# Patient Record
Sex: FEMALE | Race: WHITE | NOT HISPANIC OR LATINO | Employment: UNEMPLOYED | URBAN - METROPOLITAN AREA
[De-identification: names, ages, dates, MRNs, and addresses within clinical notes are randomized per-mention and may not be internally consistent; named-entity substitution may affect disease eponyms.]

---

## 2017-02-23 DIAGNOSIS — Z30.41 ENCOUNTER FOR SURVEILLANCE OF CONTRACEPTIVE PILLS: ICD-10-CM

## 2017-02-23 RX ORDER — DESOGESTREL AND ETHINYL ESTRADIOL 0.15-0.03
1 KIT ORAL DAILY
Qty: 30 TABLET | Refills: 0 | Status: SHIPPED | OUTPATIENT
Start: 2017-02-23 | End: 2017-04-24

## 2017-02-23 NOTE — TELEPHONE ENCOUNTER
apri      Last Written Prescription Date: 3-9-16  Last Fill Quantity: 90,  # refills: 3   [place] [facility] or [facility] prescribing provider: 3-9-16

## 2017-02-23 NOTE — TELEPHONE ENCOUNTER
Medication is being filled for 1 time refill only due to:  Patient needs to be seen because it has been more than one year since last visit.   Adrianne Ivey RN, BSN  [facility]

## 2017-04-24 ENCOUNTER — OFFICE VISIT (OUTPATIENT)
Dept: FAMILY MEDICINE | Facility: CLINIC | Age: 32
End: 2017-04-24
Payer: COMMERCIAL

## 2017-04-24 VITALS
HEIGHT: 62 IN | HEART RATE: 73 BPM | DIASTOLIC BLOOD PRESSURE: 74 MMHG | TEMPERATURE: 98.6 F | BODY MASS INDEX: 22.25 KG/M2 | WEIGHT: 120.9 LBS | SYSTOLIC BLOOD PRESSURE: 102 MMHG | OXYGEN SATURATION: 100 %

## 2017-04-24 DIAGNOSIS — Z30.41 ENCOUNTER FOR SURVEILLANCE OF CONTRACEPTIVE PILLS: Primary | ICD-10-CM

## 2017-04-24 DIAGNOSIS — L70.9 ACNE, UNSPECIFIED ACNE TYPE: ICD-10-CM

## 2017-04-24 LAB — BETA HCG QUAL IFA URINE: NEGATIVE

## 2017-04-24 PROCEDURE — 99213 OFFICE O/P EST LOW 20 MIN: CPT | Performed by: FAMILY MEDICINE

## 2017-04-24 PROCEDURE — 84703 CHORIONIC GONADOTROPIN ASSAY: CPT | Performed by: FAMILY MEDICINE

## 2017-04-24 RX ORDER — DESOGESTREL AND ETHINYL ESTRADIOL 0.15-0.03
1 KIT ORAL DAILY
Qty: 90 TABLET | Refills: 3 | Status: SHIPPED | OUTPATIENT
Start: 2017-04-24 | End: 2018-01-23

## 2017-04-24 RX ORDER — ADAPALENE 45 G/G
GEL TOPICAL AT BEDTIME
Qty: 45 G | Refills: 11 | Status: SHIPPED | OUTPATIENT
Start: 2017-04-24 | End: 2018-01-23

## 2017-04-24 ASSESSMENT — ANXIETY QUESTIONNAIRES
IF YOU CHECKED OFF ANY PROBLEMS ON THIS QUESTIONNAIRE, HOW DIFFICULT HAVE THESE PROBLEMS MADE IT FOR YOU TO DO YOUR WORK, TAKE CARE OF THINGS AT HOME, OR GET ALONG WITH OTHER PEOPLE: NOT DIFFICULT AT ALL
1. FEELING NERVOUS, ANXIOUS, OR ON EDGE: NOT AT ALL
GAD7 TOTAL SCORE: 0
3. WORRYING TOO MUCH ABOUT DIFFERENT THINGS: NOT AT ALL
7. FEELING AFRAID AS IF SOMETHING AWFUL MIGHT HAPPEN: NOT AT ALL
2. NOT BEING ABLE TO STOP OR CONTROL WORRYING: NOT AT ALL
5. BEING SO RESTLESS THAT IT IS HARD TO SIT STILL: NOT AT ALL
6. BECOMING EASILY ANNOYED OR IRRITABLE: NOT AT ALL

## 2017-04-24 ASSESSMENT — PATIENT HEALTH QUESTIONNAIRE - PHQ9: 5. POOR APPETITE OR OVEREATING: NOT AT ALL

## 2017-04-24 NOTE — PROGRESS NOTES
"Chief Complaint   Patient presents with     Contraception       Initial /74  Pulse 73  Temp 98.6  F (37  C) (Oral)  Ht 5' 2\" (1.575 m)  Wt 120 lb 14.4 oz (54.8 kg)  LMP 04/18/2017  SpO2 100%  BMI 22.11 kg/m2 Estimated body mass index is 22.11 kg/(m^2) as calculated from the following:    Height as of this encounter: 5' 2\" (1.575 m).    Weight as of this encounter: 120 lb 14.4 oz (54.8 kg).  Medication Reconciliation: complete   Bernarda Lerma Medical Assistant      "

## 2017-04-24 NOTE — MR AVS SNAPSHOT
"              After Visit Summary   2017    Bettina Andre    MRN: [id number]           Patient Information     Date Of Birth          1985        Visit Information        Provider Department      2017 2:20 PM Weiler, Karen, MD [facility]        Today's Diagnoses     Encounter for surveillance of contraceptive pills    -  1    Acne, unspecified acne type           Follow-ups after your visit        Who to contact     If you have questions or need follow up information about today's clinic visit or your schedule please contact [facility] directly at [phone].  Normal or non-critical lab and imaging results will be communicated to you by Lumicshart, letter or phone within 4 business days after the clinic has received the results. If you do not hear from us within 7 days, please contact the clinic through Lumicshart or phone. If you have a critical or abnormal lab result, we will notify you by phone as soon as possible.  Submit refill requests through Okairos or call your pharmacy and they will forward the refill request to us. Please allow 3 business days for your refill to be completed.          Additional Information About Your Visit        MyChart Information     Okairos lets you send messages to your doctor, view your test results, renew your prescriptions, schedule appointments and more. To sign up, go to [email or web address] . Click on \"Log in\" on the left side of the screen, which will take you to the Welcome page. Then click on \"Sign up Now\" on the right side of the page.     You will be asked to enter the access code listed below, as well as some personal information. Please follow the directions to create your username and password.     Your access code is: [email or web address]  Expires: 2017  2:51 PM     Your access code will  in 90 days. If you need help or a new code, please call your [facility] or [phone].        Care EveryWhere ID     This is " "your Care EveryWhere ID. This could be used by other organizations to access your [place] medical records  [id number]        Your Vitals Were     Pulse Temperature Height Last Period Pulse Oximetry BMI (Body Mass Index)    73 98.6  F (37  C) (Oral) 5' 2\" (1.575 m) 04/18/2017 100% 22.11 kg/m2       Blood Pressure from Last 3 Encounters:   04/24/17 102/74   03/09/16 108/66   01/15/15 116/74    Weight from Last 3 Encounters:   04/24/17 120 lb 14.4 oz (54.8 kg)   03/09/16 121 lb (54.9 kg)   01/15/15 125 lb (56.7 kg)              We Performed the Following     Beta HCG qual IFA urine          Today's Medication Changes          These changes are accurate as of: 4/24/17  2:51 PM.  If you have any questions, ask your nurse or doctor.               Start taking these medicines.        Dose/Directions    adapalene 0.1 % gel   Commonly known as:  DIFFERIN   Used for:  Acne, unspecified acne type   Started by:  Weiler, Karen, MD        Apply topically At Bedtime   Quantity:  45 g   Refills:  11            Where to get your medicines      These medications were sent to [facility] [address] [place] [address] [facility] [address] [place] [id number]     Phone:  [phone]     adapalene 0.1 % gel    desogestrel-ethinyl estradiol 0.15-30 MG-MCG per tablet                Primary Care Provider Office Phone # Fax #    Karen Weiler, [phone]       [facility] [address] [facility] [address]        Thank you!     Thank you for choosing [facility]  for your care. Our goal is always to provide you with excellent care. Hearing back from our patients is one way we can continue to improve our services. Please take a few minutes to complete the written survey that you may receive in the mail after your visit with us. Thank you!             Your Updated Medication List - Protect others around you: Learn how to safely use, store and throw " away your medicines at www.disposemymeds.org.          This list is accurate as of: 4/24/17  2:51 PM.  Always use your most recent med list.                   Brand Name Dispense Instructions for use    adapalene 0.1 % gel    DIFFERIN    45 g    Apply topically At Bedtime       desogestrel-ethinyl estradiol 0.15-30 MG-MCG per tablet    APRI    90 tablet    Take 1 tablet by mouth daily Must be seen in clinic for further refills

## 2017-04-25 ASSESSMENT — ANXIETY QUESTIONNAIRES: GAD7 TOTAL SCORE: 0

## 2017-04-25 ASSESSMENT — PATIENT HEALTH QUESTIONNAIRE - PHQ9: SUM OF ALL RESPONSES TO PHQ QUESTIONS 1-9: 1

## 2017-05-24 ENCOUNTER — TRANSFERRED RECORDS (OUTPATIENT)
Dept: HEALTH INFORMATION MANAGEMENT | Facility: CLINIC | Age: 32
End: 2017-05-24

## 2018-01-23 ENCOUNTER — OFFICE VISIT (OUTPATIENT)
Dept: FAMILY MEDICINE | Facility: CLINIC | Age: 33
End: 2018-01-23
Payer: COMMERCIAL

## 2018-01-23 VITALS
HEART RATE: 88 BPM | BODY MASS INDEX: 22.08 KG/M2 | DIASTOLIC BLOOD PRESSURE: 76 MMHG | WEIGHT: 120 LBS | SYSTOLIC BLOOD PRESSURE: 100 MMHG | HEIGHT: 62 IN | OXYGEN SATURATION: 99 % | TEMPERATURE: 98.5 F

## 2018-01-23 DIAGNOSIS — H92.03 OTALGIA, BILATERAL: Primary | ICD-10-CM

## 2018-01-23 DIAGNOSIS — R51.9 NONINTRACTABLE EPISODIC HEADACHE, UNSPECIFIED HEADACHE TYPE: ICD-10-CM

## 2018-01-23 PROCEDURE — 99213 OFFICE O/P EST LOW 20 MIN: CPT | Performed by: FAMILY MEDICINE

## 2018-01-23 NOTE — NURSING NOTE
"Chief Complaint   Patient presents with     Ear Problem       Initial /76 (BP Location: Right arm, Patient Position: Sitting, Cuff Size: Adult Large)  Pulse 88  Temp 98.5  F (36.9  C) (Oral)  Ht 5' 2\" (1.575 m)  Wt 120 lb (54.4 kg)  SpO2 99%  BMI 21.95 kg/m2 Estimated body mass index is 21.95 kg/(m^2) as calculated from the following:    Height as of this encounter: 5' 2\" (1.575 m).    Weight as of this encounter: 120 lb (54.4 kg).  Medication Reconciliation: complete   Adelaida Cortez Student MA      "

## 2018-01-23 NOTE — MR AVS SNAPSHOT
"              After Visit Summary   2018    Bettina Oneil    MRN: [id number]           Patient Information     Date Of Birth          1985        Visit Information        Provider Department      2018 4:00 PM Gary Dela Cruz MD [facility]        Today's Diagnoses     Otalgia, bilateral    -  1    Nonintractable episodic headache, unspecified headache type           Follow-ups after your visit        Who to contact     If you have questions or need follow up information about today's clinic visit or your schedule please contact [facility] directly at [phone].  Normal or non-critical lab and imaging results will be communicated to you by IronCurt[employer] Entertainmenthart, letter or phone within 4 business days after the clinic has received the results. If you do not hear from us within 7 days, please contact the clinic through [employer]hart or phone. If you have a critical or abnormal lab result, we will notify you by phone as soon as possible.  Submit refill requests through Ebyline or call your pharmacy and they will forward the refill request to us. Please allow 3 business days for your refill to be completed.          Additional Information About Your Visit        MyChart Information     Ebyline lets you send messages to your doctor, view your test results, renew your prescriptions, schedule appointments and more. To sign up, go to [email or web address] . Click on \"Log in\" on the left side of the screen, which will take you to the Welcome page. Then click on \"Sign up Now\" on the right side of the page.     You will be asked to enter the access code listed below, as well as some personal information. Please follow the directions to create your username and password.     Your access code is: [email or web address]  Expires: 2018  4:54 PM     Your access code will  in 90 days. If you need help or a new code, please call your [facility] or [phone].        Care EveryWhere ID     " "This is your Care EveryWhere ID. This could be used by other organizations to access your [place] medical records  [id number]        Your Vitals Were     Pulse Temperature Height Pulse Oximetry BMI (Body Mass Index)       88 98.5  F (36.9  C) (Oral) 5' 2\" (1.575 m) 99% 21.95 kg/m2        Blood Pressure from Last 3 Encounters:   01/23/18 100/76   04/24/17 102/74   03/09/16 108/66    Weight from Last 3 Encounters:   01/23/18 120 lb (54.4 kg)   04/24/17 120 lb 14.4 oz (54.8 kg)   03/09/16 121 lb (54.9 kg)              Today, you had the following     No orders found for display       Primary Care Provider Office Phone # Fax #    Karen Weiler, [phone] [address]  [facility] [address]        Equal Access to Services     [facility] : Hadii kay dodd hadasho Somichael, waaxda luqadaha, qaybta kaalmada adeegyada, ejnnifer riggs . So [facility] [phone].    ATENCIÓN: Si habla español, tiene a garcia disposición servicios gratuitos de asistencia lingüística. Llame al [phone].    We comply with applicable federal civil rights laws and [place] laws. We do not discriminate on the basis of race, color, national origin, age, disability, sex, sexual orientation, or gender identity.            Thank you!     Thank you for choosing [facility]  for your care. Our goal is always to provide you with excellent care. Hearing back from our patients is one way we can continue to improve our services. Please take a few minutes to complete the written survey that you may receive in the mail after your visit with us. Thank you!             Your Updated Medication List - Protect others around you: Learn how to safely use, store and throw away your medicines at www.disposemymeds.org.      Notice  As of 1/23/2018  4:54 PM    You have not been prescribed any medications.      "

## 2018-01-23 NOTE — PROGRESS NOTES
"  SUBJECTIVE:                                                    Bettina Andre is a 32 year old female who presents to clinic today for the following health issues:    Acute Illness   Acute illness concerns: Ear problems-started in R ear, today both ears  Onset: x3days ago    Fever: no    Chills/Sweats: no    Headache (location?): YES    Sinus Pressure:no    Conjunctivitis:  no    Ear Pain: YES- both    Rhinorrhea: no    Congestion: no    Sore Throat: no     Cough: no    Wheeze: no    Decreased Appetite: no    Nausea: no    Vomiting: no    Diarrhea:  no    Dysuria/Freq.: no    Fatigue/Achiness: no    Sick/Strep Exposure: no     Therapies Tried and outcome: ibuprofen-helps slightly    - Pt reports pain behind her left ear on Sunday, radiating to both ears yesterday. She is now experiencing \"plugged\" ears, slight difficulty hearing & HA. She denies any associated illness.         Problem list and histories reviewed & adjusted, as indicated.  Additional history: as documented    ROS:  Constitutional, HEENT, cardiovascular, pulmonary, GI, , musculoskeletal, neuro, skin, endocrine and psych systems are negative, except as otherwise noted.    This document serves as a record of the services and decisions personally performed and made by Gary Dela Cruz MD. It was created on his behalf by Gretta Reardon, a trained medical scribe. The creation of this document is based the provider's statements to the medical scribe.  Scribluna Reardon 4:48 PM, January 23, 2018    OBJECTIVE:                                                    /76 (BP Location: Right arm, Patient Position: Sitting, Cuff Size: Adult Large)  Pulse 88  Temp 98.5  F (36.9  C) (Oral)  Ht 1.575 m (5' 2\")  Wt 54.4 kg (120 lb)  SpO2 99%  BMI 21.95 kg/m2 Body mass index is 21.95 kg/(m^2).   GENERAL: healthy, alert, well nourished, well hydrated, no distress  HENT: ear canals- normal; TMs- normal; Nose- normal; Mouth- no ulcers, no lesions  NECK: no " "tenderness, no adenopathy, no asymmetry, no masses, no stiffness; thyroid- normal to palpation  RESP: lungs clear to auscultation - no rales, no rhonchi, no wheezes  CV: regular rates and rhythm, normal S1 S2, no S3 or S4 and no murmur, no click or rub -  ABDOMEN: soft, no tenderness, no  hepatosplenomegaly, no masses, normal bowel sounds  MS: extremities- no gross deformities noted, no edema  SKIN: no suspicious lesions, no rashes  PSYCH: Alert and oriented times 3; speech- coherent , normal rate and volume; able to articulate logical thoughts, able to abstract reason, no tangential thoughts, no hallucinations or delusions, affect- normal    Diagnostic test results:  None     ASSESSMENT/PLAN:         Bettina was seen today for ear problem.    Diagnoses and all orders for this visit:    Otalgia, bilateral/Nonintractable episodic headache, unspecified headache type - Advised to try intermittent ice & warm packs with ibuprofen/tylenol when needed to alleviate symptoms    Pt declined influenza vaccination at today's visit.         Risks, benefits and alternatives of treatments discussed. Plan agreed on.      Followup: Return to clinic as needed    Will call, return to clinic, or go to ED if worsening or symptoms not improving as discussed.    See patient instructions.       BMI:   Estimated body mass index is 21.95 kg/(m^2) as calculated from the following:    Height as of this encounter: 1.575 m (5' 2\").    Weight as of this encounter: 54.4 kg (120 lb).         The information in this document, created by the medical scribe for me, accurately reflects the services I personally performed and the decisions made by me. I have reviewed and approved this document for accuracy prior to leaving the patient care area.  4:48 PM, 01/23/18        Steven Dela Cruz MD   Pager: [phone]    "

## 2021-06-24 ENCOUNTER — TRANSFERRED RECORDS (OUTPATIENT)
Dept: HEALTH INFORMATION MANAGEMENT | Facility: CLINIC | Age: 36
End: 2021-06-24

## 2021-07-08 ENCOUNTER — MEDICAL CORRESPONDENCE (OUTPATIENT)
Dept: HEALTH INFORMATION MANAGEMENT | Facility: CLINIC | Age: 36
End: 2021-07-08

## 2021-07-09 ENCOUNTER — TRANSCRIBE ORDERS (OUTPATIENT)
Dept: MATERNAL FETAL MEDICINE | Facility: CLINIC | Age: 36
End: 2021-07-09

## 2021-07-09 DIAGNOSIS — O26.90 PREGNANCY RELATED CONDITION, ANTEPARTUM: Primary | ICD-10-CM

## 2021-08-06 ENCOUNTER — PRE VISIT (OUTPATIENT)
Dept: MATERNAL FETAL MEDICINE | Facility: CLINIC | Age: 36
End: 2021-08-06

## 2021-08-12 ENCOUNTER — HOSPITAL ENCOUNTER (OUTPATIENT)
Dept: ULTRASOUND IMAGING | Facility: CLINIC | Age: 36
End: 2021-08-12
Attending: OBSTETRICS & GYNECOLOGY
Payer: COMMERCIAL

## 2021-08-12 ENCOUNTER — OFFICE VISIT (OUTPATIENT)
Dept: MATERNAL FETAL MEDICINE | Facility: CLINIC | Age: 36
End: 2021-08-12
Attending: OBSTETRICS & GYNECOLOGY
Payer: COMMERCIAL

## 2021-08-12 DIAGNOSIS — O99.282 HYPERTHYROIDISM AFFECTING PREGNANCY IN SECOND TRIMESTER: Primary | ICD-10-CM

## 2021-08-12 DIAGNOSIS — O09.299 HISTORY OF INTRAUTERINE GROWTH RESTRICTION IN PRIOR PREGNANCY, CURRENTLY PREGNANT: ICD-10-CM

## 2021-08-12 DIAGNOSIS — O26.90 PREGNANCY RELATED CONDITION, ANTEPARTUM: ICD-10-CM

## 2021-08-12 DIAGNOSIS — E05.90 HYPERTHYROIDISM AFFECTING PREGNANCY IN SECOND TRIMESTER: Primary | ICD-10-CM

## 2021-08-12 PROCEDURE — 76811 OB US DETAILED SNGL FETUS: CPT | Mod: 26 | Performed by: OBSTETRICS & GYNECOLOGY

## 2021-08-12 PROCEDURE — 99203 OFFICE O/P NEW LOW 30 MIN: CPT | Mod: 25 | Performed by: OBSTETRICS & GYNECOLOGY

## 2021-08-12 PROCEDURE — 76811 OB US DETAILED SNGL FETUS: CPT

## 2021-08-12 RX ORDER — METHIMAZOLE 10 MG/1
7.5 TABLET ORAL DAILY
COMMUNITY
End: 2021-12-24

## 2021-08-12 NOTE — PROGRESS NOTES
[facility]  Maternal Fetal Medicine Consult Note    RE: Bettina Oneil  : 1985  MRUN: [id number]    2021    Dear Dr. Alexandra Michael,    Thank you for referring your patient Bettina Oneil for a Maternal-Fetal Medicine consultation today.  As you know, she is a 35 year old  19w2d gestation with an Estimated Date of Delivery: 2022 by 12w2d US not c/w LMP. She comes to [facility] today to discuss recommendations given her history of hyperthyroidism, h/o IUGR, h/o GDMA1, and short interval pregnancy.     This was an unplanned but desired pregnancy. Bettina has been stable from a thyroid perspective, was taking methimazole before pregnancy but called her endocrinologist right away when she learned of her pregnancy and was switched to PTU for the first trimester. She has since been switched back to methimazole. She denies any current symptoms related to her thyroid disorder.     Her prior pregnancy was notable for IUGR. This was identified during the second trimester and she was watched closely throughout pregnancy. She was induced at 38 wks secondary to her IUGR, but unfortunately had failure to progress and had a CS.  notes that placenta was somewhat small on delivery. That pregnancy was additionally complicated by GDMA1, pt reports that she did well with diet modifications and rarely had sugars outside of range.     Today she feels well. She denies contractions or leakage of fluid. Had a very small amount of spotting early in pregnancy. She reports good fetal movement.     Obstetrical History:    OB History    Para Term  AB Living   2 1 1 0 0 1   SAB TAB Ectopic Multiple Live Births   0 0 0 0 1      # Outcome Date GA Lbr Jose/2nd Weight Sex Delivery Anes PTL Lv   2 Current            1 Term 20 38w4d  2.495 kg (5 lb 8 oz) F CS-LTranv Spinal, EPI N TYESHA      Birth Comments: Induced, Failure to progress in labor, Fetal heart decels in labor,        Name: Jazzy     Gynecological History:  - Regular cycles.  - No reported history of frequent urinary tract infections, vaginal infections or sexually transmitted infections.  - No history of abnormal pap smears and cervical procedures/surgeries.  - No known history of myomas or uterine abnormalities.    Medical History:   Graves disease - currently on methimazole after PTU in first trimester  H/o IUGR  H/o GDMA1    Surgical History:   No prior surgical history     Medications:     Current Outpatient Medications:      methimazole (TAPAZOLE) 10 MG tablet, Take 7.5 mg by mouth daily, Disp: , Rfl:      Allergies:   NKDA     Social History:  - She  reports that she has never smoked. She has never used smokeless tobacco. She reports current alcohol use. She reports that she does not use drugs.    Family History:   Family History   Problem Relation Age of Onset     Respiratory Father         asthma     Diabetes Paternal Uncle      Diabetes Other         great paternal grandparents     - She denies a family history of motor/intellectual impairment, stillbirth, genetic or chromosome abnormalities or congenital anomalies. No known family history of a bleeding or clotting disorder.      Review of Systems:  10 point review of systems negative except as noted in the HPI    Data Reviewed:    - First trimester labs 7/8/2021   - ABO Group: A, Rh type: pos, antibody screen: neg  - Hemoglobin 13.6 mg/dL, hematocrit 38.9 %, MCV 86 fL, platelets 269 thou/ L  - Syphillis/HepBsAg/HIV: NR  - Hepatitis C: NR  - Rubella IgG: immune  - Varicella IgG:   - Hemoglobin A1C: 4.9%  - Glucose tolerance test: 136 mg/dL  - TSH: <0.010 mIU/mL  - Free T4: 1.5 ng/dL  - Free T3: 5.18 pg/mL  - TSI: reportedly negative     Ultrasound:  Please see imaging tab/separate report for ultrasound performed at [facility] today    Physical examination was deferred at this time.    In light of the patient s history as listed above our recommendations can be summarized  briefly as follows:    Problem List:   Hyperthyroidism  H/o GDMA1  H/o IUGR  Short interval pregnancy    Hyperthyroidism     The prevalence of hyperthyroidism is approximately 0.2% and most cases result from Graves  disease (85-95%). Diagnosis and management of hyperthyroidism can be complicated in the first trimester, when TSH can be suppressed due to the high concentration of hCG, which can stimulate  the thyroid gland.  Because of changes in thyroid hormone binding globulin, which increases in pregnancy, total thyroxin may increase while the free T4 may be normal. As a result, it is critical to rely on the symptoms of the patient as well as the serum free T4 concentration to diagnose and follow the hyperthyroid patient. We discourage the use of the TSH test in patients with hyperthyroidism in pregnancy because it is less helpful than the free T4 test.    The natural course of Graves hyperthyroidism is often characterized by exacerbation of symptoms in the 1st trimester and postpartum, with an amelioration of symptoms in the second half of gestation.  Graves disease is the result of excessive thyroid stimulating antibodies in the maternal circulation.  These antibodies may circulate for many years after hyperthyroidism is corrected in the mother, including after thyroidectomy or radio-active iodine treatment, and may cross the placenta resulting in fetal and  Graves disease in 1-5% of pregnancies. The fetus remains at risk even if the mother is euthyroid.  The risk is lower after thyroidectomy than after radio-iodine treatment. Signs of potential fetal hyperthyroidism include fetal tachycardia, intrauterine growth restriction, presence of fetal goiter (the earliest sonographic sign of fetal thyroid dysfunction), and fetal hydrops. Fetal thyroid status should be monitored by fetal heart rate checks, nonstress tests, and periodic anatomical ultrasounds to assess for fetal growth and goiter.    Uncontrolled  hyperthyroidism has been associated with miscarriage, premature labor, low birth weight, intrauterine fetal demise, preeclampsia, and some cardiac disorders. Poorly controlled hyperthyroidism can also lead to thyroid storm, particularly in times of stress during the pregnancy, and this poses potential serious harm to the mother and fetus. Well controlled hyperthyroidism typically has good outcomes.    Treatment is reserved for moderate to severe symptomatic disease.  Treatment usually consists of thyroid modifying agents called thionamides:  methimazole (MMI) or propylthiouracil (PTU).  Both drugs cross the placenta and both have the risk of causing fetal hypothyroidism and consequent goiter and thus should be used judiciously. Methimazole has been associated with a risk of aplasia cutis, choanal atresia, and esophageal atresia, though it is thought that the absolute risk of this is very low. PTU has a risk for agranulocytosis and liver toxicity. For this reason, PTU is recommended in the first trimester to lower teratogenic risks.  Methimazole, because of a lower risk of liver toxicity, is the favored primary treatment for the remainder of pregnancy. The goals of treatment are to keep the patient euthyroid on the minimum amount of antithyroid medication, titrating to achieve a free T4 in the high normal range.      Recommendations:  - Treatment with the appropriate thionamide should be initiated and/or continued.    - PTU is used in the first trimester  - Methimazole should be started, and PTU discontinued, in the second trimester. Which the patient has done   - Free-T4 should be monitored every 4-6 weeks and treatment adjusted as necessary to maintain free-T4 at upper end of normal range.  If doses are changed, free-T4 should be tested 2-3 weeks later.  TSH should not be followed for hyperthyroid patients.  - Close monitoring for development of preeclampsia.  - Start baby ASA for preeclampsia prevention.  - Close  monitoring for symptoms of hyperthyroidism.  - For patients with good control  - Assessment of fetal heart rate at all routine prenatal visits  - Ultrasound for growth and to assess for goiter in 3 weeks given EFW 13%ile today, repeat prn findings at that visit   - Delivery by 40-41 weeks.  - Close follow up with endocrinology during pregnancy and immediately postpartum  - Close monitoring postpartum for thyroiditis      H/o Fetal Growth Restriction    Fetal growth restriction (FGR) is defined as estimated fetal weight below the 10th percentile for gestational age.  The etiology of FGR can be classified as maternal, fetal or placental.  Examples of maternal etiologies of FGR include medical conditions such as pre-existing diabetes, lupus, antiphospholipid antibody syndrome, hypertension, certain infections as well as medication/substance use. Fetal etiologies include multiple gestation, aneuploidy, and structural anomalies. Placental or umbilical cord abnormalities can also lead to growth restriction. Pregnancies with fetal growth restriction are at increased risk of adverse  outcomes, such as fetal demise, especially when the estimated fetal weight is below the 5th percentile. Other adverse outcomes include hypoglycemia, hyperbilirubienemia, hypothermia, intraventricular hemorrhage, necrotizing enterocolitis, seizures, sepsis, respiratory distress syndrome and  death. Additionally, some pregnancies are delivered  in the setting of abnormal Doppler studies, lack of interval growth, or abnormal fetal testing on NST or biophysical profile. Furthermore there is growing evidence that small for gestational age children have increased risks of certain diseases in adulthood.      In women who have previously had a small for gestational age  the recurrence risk is estimated to be 20 percent.  A careful history should be obtained to identify potential modifiable risk factors. Although there is  no known prevention, these subsequent pregnancies can have serial assessments of fetal growth by ultrasound.      Etiology of Bettina's FGR is unclear in prior pregnancy and prenatal records from last pregnancy not available. Possibly related to her thyroid disease vs constitutionally small. No h/o pre-eclampsia during that pregnancy. EFW today 13%ile and comprehensive US otherwise normal.    Recommendations:  - Repeat growth US in 3 weeks  - If no evidence of growth restriction at that time, continue routine OB care  - If evidence of growth restriction:   - Weekly MVP, doppler, NST   - Serial growth US q monthly        At the end of our discussion, Bettina Oneil indicated that her questions were answered and she seemed satisfied with our discussion. Thank you for the opportunity to participate in your patient s care. If we can be of any further assistance, please do not hesitate to contact the clinic.      Sincerely,    Deanne Lynn MD  OB/GYN Resident PGY-2  3:37 PM August 12, 2021     Physician Attestation   I, Bruce Cordova MD, saw this patient and agree with the findings and plan of care as documented in the note.      Items personally reviewed/procedural attestation: History, ultrasound and plan of care/management. Time spent in face-to-face counseling was 30 minutes (>50% for medical management and plan of care). A copy of this consult was sent to your office.     Bruce Cordova MD

## 2021-08-12 NOTE — NURSING NOTE
Dr. Cordova and Dr. Ramirez in to do MFM consult for pt with hyperthyroid on meds and h/o FGR in last pregnancy. See consult note. Pt to come back in 3 weeks for f/u comp for growth. Pt left amb and stable. Kaila Jauregui RN

## 2021-09-03 ENCOUNTER — OFFICE VISIT (OUTPATIENT)
Dept: MATERNAL FETAL MEDICINE | Facility: CLINIC | Age: 36
End: 2021-09-03
Attending: OBSTETRICS & GYNECOLOGY
Payer: COMMERCIAL

## 2021-09-03 ENCOUNTER — HOSPITAL ENCOUNTER (OUTPATIENT)
Dept: ULTRASOUND IMAGING | Facility: CLINIC | Age: 36
End: 2021-09-03
Attending: OBSTETRICS & GYNECOLOGY
Payer: COMMERCIAL

## 2021-09-03 DIAGNOSIS — E05.90 HYPERTHYROIDISM AFFECTING PREGNANCY IN SECOND TRIMESTER: Primary | ICD-10-CM

## 2021-09-03 DIAGNOSIS — O09.299 HISTORY OF INTRAUTERINE GROWTH RESTRICTION IN PRIOR PREGNANCY, CURRENTLY PREGNANT: ICD-10-CM

## 2021-09-03 DIAGNOSIS — O99.282 HYPERTHYROIDISM AFFECTING PREGNANCY IN SECOND TRIMESTER: Primary | ICD-10-CM

## 2021-09-03 PROCEDURE — 76816 OB US FOLLOW-UP PER FETUS: CPT | Mod: 26 | Performed by: OBSTETRICS & GYNECOLOGY

## 2021-09-03 PROCEDURE — 99212 OFFICE O/P EST SF 10 MIN: CPT | Mod: 25 | Performed by: OBSTETRICS & GYNECOLOGY

## 2021-09-03 PROCEDURE — 76816 OB US FOLLOW-UP PER FETUS: CPT

## 2021-09-03 NOTE — PROGRESS NOTES
Bettina Wilsonbritnion was seen for an ultrasound today at the Maternal-Fetal Medicine center.      For the details of the ultrasound please see the report which can be found under the imaging tab.      Kaila Ly MD  , OB/GYN  Maternal-Fetal Medicine  [email or web address]  [phone] (Main MFM Office)  [phone] or [phone] (for 24 hour MFM questions)  [phone] (Pager)

## 2021-10-12 ENCOUNTER — TRANSFERRED RECORDS (OUTPATIENT)
Dept: HEALTH INFORMATION MANAGEMENT | Facility: CLINIC | Age: 36
End: 2021-10-12

## 2021-10-13 ENCOUNTER — MEDICAL CORRESPONDENCE (OUTPATIENT)
Dept: HEALTH INFORMATION MANAGEMENT | Facility: CLINIC | Age: 36
End: 2021-10-13

## 2021-10-28 ENCOUNTER — VIRTUAL VISIT (OUTPATIENT)
Dept: EDUCATION SERVICES | Facility: CLINIC | Age: 36
End: 2021-10-28
Payer: COMMERCIAL

## 2021-10-28 DIAGNOSIS — O24.410 DIET CONTROLLED GESTATIONAL DIABETES MELLITUS (GDM) IN SECOND TRIMESTER: ICD-10-CM

## 2021-10-28 DIAGNOSIS — O24.410 DIET CONTROLLED GESTATIONAL DIABETES MELLITUS (GDM) IN THIRD TRIMESTER: Primary | ICD-10-CM

## 2021-10-28 RX ORDER — LANCETS 33 GAUGE
1 EACH MISCELLANEOUS 4 TIMES DAILY
Qty: 100 EACH | Refills: 4 | Status: SHIPPED | OUTPATIENT
Start: 2021-10-28

## 2021-10-28 RX ORDER — BLOOD-GLUCOSE METER
1 EACH MISCELLANEOUS ONCE
Qty: 1 KIT | Refills: 0 | Status: SHIPPED | OUTPATIENT
Start: 2021-10-28 | End: 2021-10-28

## 2021-10-28 RX ORDER — BLOOD SUGAR DIAGNOSTIC
STRIP MISCELLANEOUS
Qty: 150 STRIP | Refills: 3 | Status: SHIPPED | OUTPATIENT
Start: 2021-10-28

## 2021-10-28 NOTE — LETTER
10/28/2021         RE: Bettina Oneil  [id number] [address] [facility] [address]        Dear Colleague,    Thank you for referring your patient, Bettina Oneil, to the [facility]. Please see a copy of my visit note below.    Diabetes Self-Management Education & Support    SUBJECTIVE/OBJECTIVE:  Presents for education related to gestational diabetes.    Type of service:  Video Visit    If the video visit is dropped, the video visit invitation should be resent by: Send to e-mail at: [email or web address]    Originating Location (pt. Location): Home  Distant Location (provider location): Home  Mode of Communication:  Video Conference via [employer]    Video Start Time: 1:03pm  Video End Time (time video stopped): 1:31 pm    How would patient like to obtain AVS? Not needed today.        Accompanied by: Self    Diabetes management related comments/concerns: Had GDM with first pregnancy - daughter is 16 months old, expecting a boy with this pregnancy. Did not check ketones with GDM in last pregnancy.    Gestational weeks: 30w2d  Number of previous pregnancies: 1  Previously had Gestational Diabetes: Yes  Had Diabetes Education before: Yes  Previous insulin or other diabetes medication during that preganancy: No  Have you ever had thyroid problems or taken thyroid medication?: (!) Yes  Heart disease, mitral valve prolapse or rheumatic fever?: No  Hypertension : No  High Cholesterol: No  High Triglycerides: Yes  Do you use tobacco products?: No  Do you drink beer, wine or hard liquor?: No    Cultural Influences/Ethnic Background:  Not  or     Estimated Date of Delivery: Jan 4, 2022    1 hour OGTT  No results found for: GLU1    3 hour OGTT    Fasting  92 mg/dL    1 hour  229 mg/dL    2 hour  201 mg/dL    3 hour  74 mg/dL    Lifestyle and Health Behaviors:  Pre-pregnancy weight (lbs): 120  Exercise:: Yes  Days per week of moderate to strenuous exercise (like a brisk walk):  7  On average, minutes per day of exercise at this level: 30  How intense was your typical exercise? : Light (like stretching or slow walking)  Exercise Minutes per Week: 210  Cultural/Baptism diet restrictions?: No  Meals include: Breakfast, Lunch, Dinner  Pre- vitamin?: Yes  Supplements?: No  Experiencing nausea?: No    Healthy Coping:  Emotional response to diabetes: Ready to learn, Concern for health and well-being, Acceptance  Informal Support system:: Family  Stage of change: ACTION (Actively working towards change)    Current Management:  Taking medications for gestational diabetes?: No  Difficulty affording diabetes medication?: No  Difficulty affording diabetes testing supplies?: No    ASSESSMENT:  Bettina is familiar with GDM management, given she had it with her last pregnancy just over a year ago. She did not feel that she needed a full review of GDM management today. We reviewed glucose goal ranges and recommended carbohydrate at meals today. Also reviewed benefits of physical activity for GDM management.     INTERVENTION:  One Touch Verio Flex meter ordered today    Educational topics covered today:  Means of controlling GDM, Blood Glucose Goals, Logging and Interpreting Glucose Results, Ketone Testing, When to Call a Diabetes Educator or OB Provider, Healthy Eating During Pregnancy, Counting Carbohydrates, Meal Planning for GDM, and Physical Activity    Educational materials provided today:   Valarie Understanding Gestational Diabetes  GDM Log Book  Sharps Disposal    Pt verbalized understanding of concepts discussed and recommendations provided today.     PLAN:  Check glucose 4 times daily, before breakfast and 1 hour after each meal.     Check Ketones daily for one week, if negative, reduce testing to once a week.     Physical activity recommended: 20-30 minutes daily, can break into smaller amounts during the day or after meals.    Meal plan: 30-45 g carbs at breakfast, 45-60 g carbs at  lunch, 45-60 g carbs at supper, 15-30 g carbs at 3 snacks a day.  Follow consistent CHO meal plan, eat CHO and protein/fat at all meals/snacks.    Call/e-mail/MyChart message diabetes educator if 3 or more blood sugars are above the goal in 1 week, if ketones are positive, or with questions/concerns.    Sole Meade, MPH, RDN, LD, CDCES   Time Spent: 28 minutes  Encounter Type: Individual

## 2021-10-28 NOTE — PROGRESS NOTES
Diabetes Self-Management Education & Support    SUBJECTIVE/OBJECTIVE:  Presents for education related to gestational diabetes.    Type of service:  Video Visit    If the video visit is dropped, the video visit invitation should be resent by: Send to e-mail at: [email or web address]    Originating Location (pt. Location): Home  Distant Location (provider location): Home  Mode of Communication:  Video Conference via [employer]    Video Start Time: 1:03pm  Video End Time (time video stopped): 1:31 pm    How would patient like to obtain AVS? Not needed today.        Accompanied by: Self    Diabetes management related comments/concerns: Had GDM with first pregnancy - daughter is 16 months old, expecting a boy with this pregnancy. Did not check ketones with GDM in last pregnancy.    Gestational weeks: 30w2d  Number of previous pregnancies: 1  Previously had Gestational Diabetes: Yes  Had Diabetes Education before: Yes  Previous insulin or other diabetes medication during that preganancy: No  Have you ever had thyroid problems or taken thyroid medication?: (!) Yes  Heart disease, mitral valve prolapse or rheumatic fever?: No  Hypertension : No  High Cholesterol: No  High Triglycerides: Yes  Do you use tobacco products?: No  Do you drink beer, wine or hard liquor?: No    Cultural Influences/Ethnic Background:  Not  or     Estimated Date of Delivery: Jan 4, 2022    1 hour OGTT  No results found for: GLU1    3 hour OGTT    Fasting  92 mg/dL    1 hour  229 mg/dL    2 hour  201 mg/dL    3 hour  74 mg/dL    Lifestyle and Health Behaviors:  Pre-pregnancy weight (lbs): 120  Exercise:: Yes  Days per week of moderate to strenuous exercise (like a brisk walk): 7  On average, minutes per day of exercise at this level: 30  How intense was your typical exercise? : Light (like stretching or slow walking)  Exercise Minutes per Week: 210  Cultural/Restorationism diet restrictions?: No  Meals include: Breakfast, Lunch,  Dinner  Pre- vitamin?: Yes  Supplements?: No  Experiencing nausea?: No    Healthy Coping:  Emotional response to diabetes: Ready to learn, Concern for health and well-being, Acceptance  Informal Support system:: Family  Stage of change: ACTION (Actively working towards change)    Current Management:  Taking medications for gestational diabetes?: No  Difficulty affording diabetes medication?: No  Difficulty affording diabetes testing supplies?: No    ASSESSMENT:  Bettina is familiar with GDM management, given she had it with her last pregnancy just over a year ago. She did not feel that she needed a full review of GDM management today. We reviewed glucose goal ranges and recommended carbohydrate at meals today. Also reviewed benefits of physical activity for GDM management.     INTERVENTION:  One Touch Verio Flex meter ordered today    Educational topics covered today:  Means of controlling GDM, Blood Glucose Goals, Logging and Interpreting Glucose Results, Ketone Testing, When to Call a Diabetes Educator or OB Provider, Healthy Eating During Pregnancy, Counting Carbohydrates, Meal Planning for GDM, and Physical Activity    Educational materials provided today:   Valarie Understanding Gestational Diabetes  GDM Log Book  [employer] Disposal    Pt verbalized understanding of concepts discussed and recommendations provided today.     PLAN:  Check glucose 4 times daily, before breakfast and 1 hour after each meal.     Check Ketones daily for one week, if negative, reduce testing to once a week.     Physical activity recommended: 20-30 minutes daily, can break into smaller amounts during the day or after meals.    Meal plan: 30-45 g carbs at breakfast, 45-60 g carbs at lunch, 45-60 g carbs at supper, 15-30 g carbs at 3 snacks a day.  Follow consistent CHO meal plan, eat CHO and protein/fat at all meals/snacks.    Call/e-mail/[employer] message diabetes educator if 3 or more blood sugars are above the goal in 1 week, if  ketones are positive, or with questions/concerns.    Sole Meade, MPH, RDN, LD, CDCES   Time Spent: 28 minutes  Encounter Type: Individual

## 2021-10-28 NOTE — PATIENT INSTRUCTIONS
Your 1 week follow-up Diabetes Education video visit is scheduled on 11/4/21 at 1:30 pm    1. Check blood sugar 4 times a day, before breakfast and 1 hour after the start of each meal.     2. Check urine ketones when you wake up every morning for 7 days. If negative everyday, reduce testing to once a week.    3. Follow the recommended meal plan: eat something every 2-3 hours, include protein/fat and carbohydrate at every meal and snack, have 30-45 grams carbs at breakfast, 45-60 grams carbs at lunch, 45-60 grams carbs at supper, 15-30 grams carbs at 3 snacks per day.     4. Add activity to every day, try walking or being active after each meal to help control blood sugar levels.    5.Call or send a imeemt message to your educator if 3 or more blood sugars are above goal in 1 week, you have an elevated ketone result (trace or higher), or with questions or concerns.      [facility] for the [facility] Area:  For Your Diabetes Education or Nutrition Appointments Call:  [phone]   For Diabetes Education and Nutrition Related Questions:   Phone: [phone]  Send [employer] Message   If you need a medication refill please contact your pharmacy. Please allow 3 business days for your refills to be completed.

## 2021-11-04 ENCOUNTER — VIRTUAL VISIT (OUTPATIENT)
Dept: EDUCATION SERVICES | Facility: CLINIC | Age: 36
End: 2021-11-04
Payer: COMMERCIAL

## 2021-11-04 DIAGNOSIS — O24.410 DIET CONTROLLED GESTATIONAL DIABETES MELLITUS (GDM) IN THIRD TRIMESTER: ICD-10-CM

## 2021-11-04 DIAGNOSIS — O24.410 DIET CONTROLLED GESTATIONAL DIABETES MELLITUS (GDM) IN SECOND TRIMESTER: Primary | ICD-10-CM

## 2021-11-04 PROCEDURE — G0108 DIAB MANAGE TRN  PER INDIV: HCPCS | Mod: GT | Performed by: DIETITIAN, REGISTERED

## 2021-11-04 RX ORDER — BLOOD SUGAR DIAGNOSTIC
STRIP MISCELLANEOUS
Qty: 50 STRIP | Refills: 4 | Status: SHIPPED | OUTPATIENT
Start: 2021-11-04

## 2021-11-04 NOTE — PROGRESS NOTES
Diabetes Self-Management Education & Support  Type of service:  Video Visit    If the video visit is dropped, the video visit invitation should be resent by: Send to e-mail at: [email or web address]    Originating Location (pt. Location): Home  Distant Location (provider location): Home  Mode of Communication:  Video Conference via [employer]    Video Start Time: 1:30  Video End Time (time video stopped): 1:46    How would patient like to obtain AVS? MyChart    SUBJECTIVE/OBJECTIVE:  Presents for education related to gestational diabetes.    Accompanied by: Self  Diabetes management related comments/concerns: Had GDM with first pregnancy - daughter is 16 months old, expecting a boy with this pregnancy. Did not check ketones with GDM in last pregnancy.  Gestational weeks: 31w2d  Number of previous pregnancies: 1  Previously had Gestational Diabetes: Yes  Had Diabetes Education before: Yes  Previous insulin or other diabetes medication during that preganancy: No  Have you ever had thyroid problems or taken thyroid medication?: (!) Yes  Heart disease, mitral valve prolapse or rheumatic fever?: No  Hypertension : No  High Cholesterol: No  High Triglycerides: Yes  Do you use tobacco products?: No  Do you drink beer, wine or hard liquor?: No    Cultural Influences/Ethnic Background:  Not  or     LMP 04/07/2021      Estimated Date of Delivery: Jan 4, 2022    Blood Glucose/Ketone Log:  Has not started testing yet    Lifestyle and Health Behaviors:  Pre-pregnancy weight (lbs): 120  Exercise:: Yes  Days per week of moderate to strenuous exercise (like a brisk walk): 7  On average, minutes per day of exercise at this level: 30  How intense was your typical exercise? : Light (like stretching or slow walking)  Exercise Minutes per Week: 210  Cultural/Advent diet restrictions?: No  Meal planning/habits: Carb counting  Meals include: Breakfast, Lunch, Dinner, Morning Snack, Afternoon Snack, Evening  Snack  Beverages: Water  Pre-chris vitamin?: Yes  Supplements?: No  Experiencing nausea?: No    Healthy Coping:  Emotional response to diabetes: Ready to learn, Concern for health and well-being, Acceptance  Informal Support system:: Family  Stage of change: ACTION (Actively working towards change)    Current Management:  Taking medications for gestational diabetes?: No  Difficulty affording diabetes medication?: No  Difficulty affording diabetes testing supplies?: No    ASSESSMENT:  Unable to assess    Bettina was given a OneTouch Verio meter, however she has not been able to get anything other than an error reading. She does still have her Guide meter, and wants a new prescription for her Guide meter. We did still review post partum education and reviewed food, however will follow up next week for BG review as well.    INTERVENTION:  Educational topics covered today:  What to expect after delivery, Future testing for Type 2 diabetes (2 hour OGTT at 6 week post-partum check-up and annual fasting blood glucose level), Risk of GDM and planning ahead for future pregnancies, Recommended lifestyle interventions for reducing the risk of Type 2 Diabetes, When to Call a Diabetes Educator or OB Provider    Educational Materials provided today:  [facility] Preventing Diabetes    PLAN:  Check glucose 4 times daily.  Check ketones once a week when readings are consistently negative.  Continue with recommended physical activity.  Continue to follow recommended meal plan: 2-3 carbs at breakfast, 3-4 carbs at lunch, 3-4 carbs at supper, 1-2 carbs at snacks.  Follow consistent CHO meal plan, eat CHO and protein/fat at all meals/snacks.    Call/e-mail/mymxloghart message diabetes educator if 3 or more blood sugars are above the goal in 1 week or if ketones are positive.    SUDEEP Hector [facility]  Time Spent: 15 minutes  Encounter Type: Individual    Any diabetes medication dose changes were made via the CDE Protocol and Collaborative  Practice Agreement with the patient's OB/GYN provider. A copy of this encounter was shared with the provider.

## 2021-11-10 ENCOUNTER — MYC MEDICAL ADVICE (OUTPATIENT)
Dept: EDUCATION SERVICES | Facility: CLINIC | Age: 36
End: 2021-11-10
Payer: COMMERCIAL

## 2021-11-11 NOTE — TELEPHONE ENCOUNTER
Gestational Diabetes Follow-up    Subjective/Objective:    Bettina Oneil sent in blood glucose log for review. Last date of communication was: 11/4/2021.    Gestational diabetes is being managed with diet and activity    Taking diabetes medications: no    Estimated Date of Delivery: Jan 4, 2022    BG/Food Log:     Meals: 11/8  Breakfast: eggs, potatoes and strawberries   Snack: brisket and almonds 97  Lunch: rice, beans and chicken 113  Snack: ice cream 110  Dinner: rice, corn, [place] sprouts 124     Meals: 11/9  Fast: 98  Breakfast: oatmeal and coffee 133  Snack: corn chips and salsa 85  Lunch: chipotle ( rice, beans, veggies, lettuce, cheese, sour cream) 117  Snack: none   Dinner: pork tacos (3) 110        Meals 11/10  Fast: 94  Breakfast: eggs, hash browns and coffee 107  Snack: unsweetened yogurt, strawberries, granola and honey 103   Lunch: 5 chicken nuggets, almonds 108  Snack:   Dinner: meatballs, green beans and rice 153    Assessment:    Ketones: na.   Fasting blood glucoses: 50% in target.  After breakfast: 100% in target.  After lunch: 100% in target.  After dinner: 66% in target.    Plan/Response:  No changes in the patient's current treatment plan.  Follow-up on Monday.  Encouraged bedtime snack    SUDEEP Hector CDCES    Any diabetes medication dose changes were made via the CDE Protocol and Collaborative Practice Agreement with the patient's OB/GYN provider. A copy of this encounter was shared with the provider.

## 2021-11-15 ENCOUNTER — MYC MEDICAL ADVICE (OUTPATIENT)
Dept: EDUCATION SERVICES | Facility: CLINIC | Age: 36
End: 2021-11-15
Payer: COMMERCIAL

## 2021-11-16 ENCOUNTER — MYC MEDICAL ADVICE (OUTPATIENT)
Dept: EDUCATION SERVICES | Facility: CLINIC | Age: 36
End: 2021-11-16
Payer: COMMERCIAL

## 2021-11-16 NOTE — TELEPHONE ENCOUNTER
Gestational Diabetes Follow-up    Subjective/Objective:    Bettina K Okleticia sent in blood glucose log for review. Last date of communication was: 11/10/2021.    Gestational diabetes is being managed with diet and activity    Taking diabetes medications: no    Estimated Date of Delivery: Jan 4, 2022    BG/Food Log:     Meals 11/11     Fast: 95   Breakfast: eggs, hashbrowns, coffee 113   Lunch: salad, chips and guacamole 164   Dinner: spaghetti 140     Meals 11/12     Fast: 100   Breakfast: 1 cinnamon roll 160   Lunch: spaghetti 116   Dinner: 2 slices of pizza 121     Meals 11/13     Fast: 97   Breakfast: eggs, hashbrowns and coffee 102   Lunch: macaroni and cheese, almonds 99   Dinner: 1 slice of pizza 153     Meals 11/14     Fast: 84   Breakfast: eggs and toast: 102   We were out for the afternoon/ evening for a family thing and I forgot my blood sugar test at home.     Meals 11/15     Fast: 101   Breakfast: half a bagel with cream cheese 103   Lunch: [place] 153   Snack: half an apple   Dinner: mashed potatoes, roasted carrots, roasted chicken 121       Assessment:    Ketones: na.   Fasting blood glucoses: 40% in target.  After breakfast: 80% in target.  After lunch: 50% in target.  After dinner: 75% in target.    Unsure if patient had started a bedtime snack yet (it was recommended 11/10, however [employer] message seemed as though she would start now?). If she has not tried bedtime snack, may give her 2 days to try, otherwise should be sent to [facility] for insulin start.    Plan/Response:  Can try bedtime snack for 2 days if not already doing, otherwise will send to  Endo - DTVCasthart response sent    SUDEEP Hector CDCES    Any diabetes medication dose changes were made via the CDE Protocol and Collaborative Practice Agreement with the patient's OB/GYN provider. A copy of this encounter was shared with the provider.

## 2021-11-18 ENCOUNTER — TELEPHONE (OUTPATIENT)
Dept: EDUCATION SERVICES | Facility: CLINIC | Age: 36
End: 2021-11-18
Payer: COMMERCIAL

## 2021-11-18 RX ORDER — OXYTOCIN 10 [USP'U]/ML
10 INJECTION, SOLUTION INTRAMUSCULAR; INTRAVENOUS
Status: CANCELLED | OUTPATIENT
Start: 2021-11-18

## 2021-11-18 RX ORDER — CARBOPROST TROMETHAMINE 250 UG/ML
250 INJECTION, SOLUTION INTRAMUSCULAR
Status: CANCELLED | OUTPATIENT
Start: 2021-11-18

## 2021-11-18 RX ORDER — CITRIC ACID/SODIUM CITRATE 334-500MG
30 SOLUTION, ORAL ORAL
Status: CANCELLED | OUTPATIENT
Start: 2021-11-18

## 2021-11-18 RX ORDER — CEFAZOLIN SODIUM 2 G/100ML
2 INJECTION, SOLUTION INTRAVENOUS
Status: CANCELLED | OUTPATIENT
Start: 2021-11-18

## 2021-11-18 RX ORDER — MISOPROSTOL 200 UG/1
400 TABLET ORAL
Status: CANCELLED | OUTPATIENT
Start: 2021-11-18

## 2021-11-18 RX ORDER — LIDOCAINE 40 MG/G
CREAM TOPICAL
Status: CANCELLED | OUTPATIENT
Start: 2021-11-18

## 2021-11-18 RX ORDER — METHYLERGONOVINE MALEATE 0.2 MG/ML
200 INJECTION INTRAVENOUS
Status: CANCELLED | OUTPATIENT
Start: 2021-11-18

## 2021-11-18 RX ORDER — OXYTOCIN/0.9 % SODIUM CHLORIDE 30/500 ML
340 PLASTIC BAG, INJECTION (ML) INTRAVENOUS CONTINUOUS PRN
Status: CANCELLED | OUTPATIENT
Start: 2021-11-18

## 2021-11-18 RX ORDER — ACETAMINOPHEN 325 MG/1
975 TABLET ORAL ONCE
Status: CANCELLED | OUTPATIENT
Start: 2021-11-18 | End: 2021-11-18

## 2021-11-18 RX ORDER — TRANEXAMIC ACID 10 MG/ML
1 INJECTION, SOLUTION INTRAVENOUS EVERY 30 MIN PRN
Status: CANCELLED | OUTPATIENT
Start: 2021-11-18

## 2021-11-18 RX ORDER — OXYTOCIN/0.9 % SODIUM CHLORIDE 30/500 ML
100-340 PLASTIC BAG, INJECTION (ML) INTRAVENOUS CONTINUOUS PRN
Status: CANCELLED | OUTPATIENT
Start: 2021-11-18

## 2021-11-18 RX ORDER — CEFAZOLIN SODIUM 2 G/100ML
2 INJECTION, SOLUTION INTRAVENOUS SEE ADMIN INSTRUCTIONS
Status: CANCELLED | OUTPATIENT
Start: 2021-11-18

## 2021-11-18 RX ORDER — SODIUM CHLORIDE, SODIUM LACTATE, POTASSIUM CHLORIDE, CALCIUM CHLORIDE 600; 310; 30; 20 MG/100ML; MG/100ML; MG/100ML; MG/100ML
INJECTION, SOLUTION INTRAVENOUS CONTINUOUS
Status: CANCELLED | OUTPATIENT
Start: 2021-11-18

## 2021-11-18 RX ORDER — MISOPROSTOL 200 UG/1
800 TABLET ORAL
Status: CANCELLED | OUTPATIENT
Start: 2021-11-18

## 2021-11-18 NOTE — LETTER
11/18/2021         RE: Bettina Oneil  [id number] [address] [id number]        Dear Colleague,    Thank you for referring your patient, Bettina Oneil, to the [facility]. Please see a copy of my visit note below.    No notes on file    Gestational Diabetes Follow-up     Subjective/Objective:     Bettina Oneil sent in blood glucose log for review. Last date of communication was: 11/15/21.     Gestational diabetes is being managed with diet and activity     Taking diabetes medications: no     Estimated Date of Delivery: Jan 4, 2022     BG/Food Log:      Meals 11/11     Fast: 95  Breakfast: eggs, hashbrowns, coffee 113  Lunch: salad, chips and guacamole 164  Dinner: spaghetti 140     Meals 11/12     Fast: 100  Breakfast: 1 cinnamon roll 160  Lunch: spaghetti 116  Dinner: 2 slices of pizza 121     Meals 11/13      Fast: 97  Breakfast: eggs, hashbrowns and coffee 102  Lunch: macaroni and cheese, almonds 99  Dinner: 1 slice of pizza 153     Meals 11/14     Fast: 84  Breakfast: eggs and toast: 102  We were out for the afternoon/ evening for a family thing and I forgot my blood sugar test at home.      Meals 11/15     Fast: 101  Breakfast: half a bagel with cream cheese 103  Lunch: [place] 153  Snack: half an apple   Dinner: mashed potatoes, roasted carrots, roasted chicken 121     11/16     Fast: 93  Breakfast: eggs w/ tomatoes , hashbrowns, coffee 93  Lunch: Crackers and cheese 109  Dinner: cheeseburger (made at home) and [place] sprouts 119      11/17  Fast:100  Breakfast: egg bake 97  Lunch: half grilled cheese sandwich 142  Dinner: Pasta with chicken and veggies 122     11/18     Fast: 99  Breakfast: eggs and toast 114  Lunch: bagel 120        Assessment:  Fasting blood sugars more consistently elevated even with the addition of bedtime snack.  Recommend insulin at this point.      Ketones: none reported.   Fasting blood glucoses: 38% in target.  After breakfast: 63% in  target.  After lunch: 43% in target.  After dinner: 83% in target.     Plan/Response:  Recommend referral to Endocrinology as requested by clinic to start insulin.     No further follow up unless questions     Gerri Morales MS, RD, LD, CDE        Any diabetes medication dose changes were made via the CDE Protocol and Collaborative Practice Agreement with the patient's OB/GYN provider. A copy of this encounter was shared with the provider.

## 2021-11-18 NOTE — TELEPHONE ENCOUNTER
Gestational Diabetes Follow-up    Subjective/Objective:    Bettina Oneil sent in blood glucose log for review. Last date of communication was: 11/15/21.    Gestational diabetes is being managed with diet and activity    Taking diabetes medications: no    Estimated Date of Delivery: Jan 4, 2022    BG/Food Log:     Meals 11/11     Fast: 95  Breakfast: eggs, hashbrowns, coffee 113  Lunch: salad, chips and guacamole 164  Dinner: spaghetti 140     Meals 11/12     Fast: 100  Breakfast: 1 cinnamon roll 160  Lunch: spaghetti 116  Dinner: 2 slices of pizza 121     Meals 11/13      Fast: 97  Breakfast: eggs, hashbrowns and coffee 102  Lunch: macaroni and cheese, almonds 99  Dinner: 1 slice of pizza 153     Meals 11/14     Fast: 84  Breakfast: eggs and toast: 102  We were out for the afternoon/ evening for a family thing and I forgot my blood sugar test at home.      Meals 11/15     Fast: 101  Breakfast: half a bagel with cream cheese 103  Lunch: [place] 153  Snack: half an apple   Dinner: mashed potatoes, roasted carrots, roasted chicken 121    11/16     Fast: 93  Breakfast: eggs w/ tomatoes , hashbrowns, coffee 93  Lunch: Crackers and cheese 109  Dinner: cheeseburger (made at home) and [place] sprouts 119      11/17  Fast:100  Breakfast: egg bake 97  Lunch: half grilled cheese sandwich 142  Dinner: Pasta with chicken and veggies 122     11/18     Fast: 99  Breakfast: eggs and toast 114  Lunch: bagel 120      Assessment:  Fasting blood sugars more consistently elevated even with the addition of bedtime snack.  Recommend insulin at this point.     Ketones: none reported.   Fasting blood glucoses: 38% in target.  After breakfast: 63% in target.  After lunch: 43% in target.  After dinner: 83% in target.    Plan/Response:  Recommend referral to Endocrinology as requested by clinic to start insulin.    No further follow up unless questions    Gerri Morales MS, RD, LD, CDE      Any diabetes medication dose changes were made  via the CDE Protocol and Collaborative Practice Agreement with the patient's OB/GYN provider. A copy of this encounter was shared with the provider.

## 2021-12-11 DIAGNOSIS — Z11.59 ENCOUNTER FOR SCREENING FOR OTHER VIRAL DISEASES: ICD-10-CM

## 2021-12-19 ENCOUNTER — HEALTH MAINTENANCE LETTER (OUTPATIENT)
Age: 36
End: 2021-12-19

## 2021-12-24 RX ORDER — METHIMAZOLE 5 MG/1
7.5 TABLET ORAL DAILY
COMMUNITY

## 2021-12-25 NOTE — PHARMACY-ADMISSION MEDICATION HISTORY
Medication reconciliation completed by pre-admitting.    Prior to Admission medications    Medication Sig Last Dose Taking? Auth Provider   methimazole (TAPAZOLE) 5 MG tablet Take 7.5 mg by mouth daily  Yes Unknown, Entered By History   Prenatal Vit-Fe Fumarate-FA (PRENATAL VITAMIN PO) Take 1 tablet by mouth daily  Yes Reported, Patient   acetone urine (KETOSTIX) test strip Check ketones daily for 1 week or until negative for 1 week, then once a week thereafter.   Celeste Moser MD   blood glucose (ACCU-CHEK GUIDE) test strip Use to test blood sugar 4 times daily or as directed.   Celeste Moser MD   OneTouch Delica Lancets 33G MISC 1 each 4 times daily   Celeste Moser MD   ONETOUCH VERIO IQ test strip Use to test blood sugar 4 times daily.   Celeste Moser MD

## 2021-12-26 ENCOUNTER — LAB (OUTPATIENT)
Dept: URGENT CARE | Facility: URGENT CARE | Age: 36
End: 2021-12-26
Attending: OBSTETRICS & GYNECOLOGY
Payer: COMMERCIAL

## 2021-12-26 DIAGNOSIS — Z11.59 ENCOUNTER FOR SCREENING FOR OTHER VIRAL DISEASES: ICD-10-CM

## 2021-12-26 LAB — SARS-COV-2 RNA RESP QL NAA+PROBE: NEGATIVE

## 2021-12-26 PROCEDURE — U0003 INFECTIOUS AGENT DETECTION BY NUCLEIC ACID (DNA OR RNA); SEVERE ACUTE RESPIRATORY SYNDROME CORONAVIRUS 2 (SARS-COV-2) (CORONAVIRUS DISEASE [COVID-19]), AMPLIFIED PROBE TECHNIQUE, MAKING USE OF HIGH THROUGHPUT TECHNOLOGIES AS DESCRIBED BY CMS-2020-01-R: HCPCS

## 2021-12-26 PROCEDURE — U0005 INFEC AGEN DETEC AMPLI PROBE: HCPCS

## 2021-12-28 ENCOUNTER — LAB (OUTPATIENT)
Dept: LAB | Facility: CLINIC | Age: 36
End: 2021-12-28
Payer: COMMERCIAL

## 2021-12-28 DIAGNOSIS — Z01.812 PRE-OPERATIVE LABORATORY EXAMINATION: ICD-10-CM

## 2021-12-28 LAB
ABO/RH(D): NORMAL
ABO/RH(D): NORMAL
ANTIBODY SCREEN: NEGATIVE
HGB BLD-MCNC: 13.2 G/DL (ref 11.7–15.7)
SPECIMEN EXPIRATION DATE: NORMAL
SPECIMEN EXPIRATION DATE: NORMAL
T PALLIDUM AB SER QL: NONREACTIVE

## 2021-12-28 PROCEDURE — 86901 BLOOD TYPING SEROLOGIC RH(D): CPT

## 2021-12-28 PROCEDURE — 85018 HEMOGLOBIN: CPT

## 2021-12-28 PROCEDURE — 86780 TREPONEMA PALLIDUM: CPT

## 2021-12-28 PROCEDURE — 36415 COLL VENOUS BLD VENIPUNCTURE: CPT

## 2021-12-28 PROCEDURE — 86850 RBC ANTIBODY SCREEN: CPT

## 2021-12-29 ENCOUNTER — HOSPITAL ENCOUNTER (INPATIENT)
Facility: CLINIC | Age: 36
LOS: 2 days | Discharge: HOME HEALTH | End: 2021-12-31
Attending: OBSTETRICS & GYNECOLOGY | Admitting: OBSTETRICS & GYNECOLOGY
Payer: COMMERCIAL

## 2021-12-29 ENCOUNTER — ANESTHESIA EVENT (OUTPATIENT)
Dept: OBGYN | Facility: CLINIC | Age: 36
End: 2021-12-29
Payer: COMMERCIAL

## 2021-12-29 ENCOUNTER — ANESTHESIA (OUTPATIENT)
Dept: OBGYN | Facility: CLINIC | Age: 36
End: 2021-12-29
Payer: COMMERCIAL

## 2021-12-29 DIAGNOSIS — E05.90 HYPERTHYROIDISM: ICD-10-CM

## 2021-12-29 DIAGNOSIS — D62 ANEMIA DUE TO BLOOD LOSS, ACUTE: ICD-10-CM

## 2021-12-29 DIAGNOSIS — Z98.891 S/P CESAREAN SECTION: Primary | ICD-10-CM

## 2021-12-29 LAB — GLUCOSE BLDC GLUCOMTR-MCNC: 86 MG/DL (ref 70–99)

## 2021-12-29 PROCEDURE — 250N000011 HC RX IP 250 OP 636: Performed by: ANESTHESIOLOGY

## 2021-12-29 PROCEDURE — 250N000009 HC RX 250: Performed by: OBSTETRICS & GYNECOLOGY

## 2021-12-29 PROCEDURE — 272N000001 HC OR GENERAL SUPPLY STERILE: Performed by: OBSTETRICS & GYNECOLOGY

## 2021-12-29 PROCEDURE — 120N000001 HC R&B MED SURG/OB

## 2021-12-29 PROCEDURE — 370N000017 HC ANESTHESIA TECHNICAL FEE, PER MIN: Performed by: OBSTETRICS & GYNECOLOGY

## 2021-12-29 PROCEDURE — 710N000009 HC RECOVERY PHASE 1, LEVEL 1, PER MIN: Performed by: OBSTETRICS & GYNECOLOGY

## 2021-12-29 PROCEDURE — 258N000003 HC RX IP 258 OP 636: Performed by: OBSTETRICS & GYNECOLOGY

## 2021-12-29 PROCEDURE — 250N000011 HC RX IP 250 OP 636: Performed by: OBSTETRICS & GYNECOLOGY

## 2021-12-29 PROCEDURE — 258N000003 HC RX IP 258 OP 636: Performed by: NURSE ANESTHETIST, CERTIFIED REGISTERED

## 2021-12-29 PROCEDURE — 250N000011 HC RX IP 250 OP 636: Performed by: NURSE ANESTHETIST, CERTIFIED REGISTERED

## 2021-12-29 PROCEDURE — 250N000013 HC RX MED GY IP 250 OP 250 PS 637: Performed by: OBSTETRICS & GYNECOLOGY

## 2021-12-29 PROCEDURE — 360N000076 HC SURGERY LEVEL 3, PER MIN: Performed by: OBSTETRICS & GYNECOLOGY

## 2021-12-29 RX ORDER — METOCLOPRAMIDE HYDROCHLORIDE 5 MG/ML
10 INJECTION INTRAMUSCULAR; INTRAVENOUS EVERY 6 HOURS PRN
Status: DISCONTINUED | OUTPATIENT
Start: 2021-12-29 | End: 2021-12-31 | Stop reason: HOSPADM

## 2021-12-29 RX ORDER — OXYTOCIN 10 [USP'U]/ML
10 INJECTION, SOLUTION INTRAMUSCULAR; INTRAVENOUS
Status: DISCONTINUED | OUTPATIENT
Start: 2021-12-29 | End: 2021-12-29

## 2021-12-29 RX ORDER — FENTANYL CITRATE 50 UG/ML
INJECTION, SOLUTION INTRAMUSCULAR; INTRAVENOUS
Status: COMPLETED | OUTPATIENT
Start: 2021-12-29 | End: 2021-12-29

## 2021-12-29 RX ORDER — METOCLOPRAMIDE 10 MG/1
10 TABLET ORAL EVERY 6 HOURS PRN
Status: DISCONTINUED | OUTPATIENT
Start: 2021-12-29 | End: 2021-12-31 | Stop reason: HOSPADM

## 2021-12-29 RX ORDER — PROCHLORPERAZINE MALEATE 10 MG
10 TABLET ORAL EVERY 6 HOURS PRN
Status: DISCONTINUED | OUTPATIENT
Start: 2021-12-29 | End: 2021-12-31 | Stop reason: HOSPADM

## 2021-12-29 RX ORDER — OXYTOCIN/0.9 % SODIUM CHLORIDE 30/500 ML
340 PLASTIC BAG, INJECTION (ML) INTRAVENOUS CONTINUOUS PRN
Status: DISCONTINUED | OUTPATIENT
Start: 2021-12-29 | End: 2021-12-31 | Stop reason: HOSPADM

## 2021-12-29 RX ORDER — CARBOPROST TROMETHAMINE 250 UG/ML
250 INJECTION, SOLUTION INTRAMUSCULAR
Status: DISCONTINUED | OUTPATIENT
Start: 2021-12-29 | End: 2021-12-31 | Stop reason: HOSPADM

## 2021-12-29 RX ORDER — KETOROLAC TROMETHAMINE 30 MG/ML
INJECTION, SOLUTION INTRAMUSCULAR; INTRAVENOUS PRN
Status: DISCONTINUED | OUTPATIENT
Start: 2021-12-29 | End: 2021-12-29

## 2021-12-29 RX ORDER — DEXTROSE, SODIUM CHLORIDE, SODIUM LACTATE, POTASSIUM CHLORIDE, AND CALCIUM CHLORIDE 5; .6; .31; .03; .02 G/100ML; G/100ML; G/100ML; G/100ML; G/100ML
INJECTION, SOLUTION INTRAVENOUS CONTINUOUS
Status: DISCONTINUED | OUTPATIENT
Start: 2021-12-29 | End: 2021-12-31 | Stop reason: HOSPADM

## 2021-12-29 RX ORDER — PROCHLORPERAZINE 25 MG
25 SUPPOSITORY, RECTAL RECTAL EVERY 12 HOURS PRN
Status: DISCONTINUED | OUTPATIENT
Start: 2021-12-29 | End: 2021-12-31 | Stop reason: HOSPADM

## 2021-12-29 RX ORDER — TRANEXAMIC ACID 10 MG/ML
1 INJECTION, SOLUTION INTRAVENOUS EVERY 30 MIN PRN
Status: DISCONTINUED | OUTPATIENT
Start: 2021-12-29 | End: 2021-12-29

## 2021-12-29 RX ORDER — AMOXICILLIN 250 MG
1 CAPSULE ORAL 2 TIMES DAILY
Status: DISCONTINUED | OUTPATIENT
Start: 2021-12-29 | End: 2021-12-31 | Stop reason: HOSPADM

## 2021-12-29 RX ORDER — LIDOCAINE 40 MG/G
CREAM TOPICAL
Status: DISCONTINUED | OUTPATIENT
Start: 2021-12-29 | End: 2021-12-29

## 2021-12-29 RX ORDER — ONDANSETRON 2 MG/ML
4 INJECTION INTRAMUSCULAR; INTRAVENOUS EVERY 6 HOURS PRN
Status: DISCONTINUED | OUTPATIENT
Start: 2021-12-29 | End: 2021-12-31 | Stop reason: HOSPADM

## 2021-12-29 RX ORDER — NICOTINE POLACRILEX 4 MG
15-30 LOZENGE BUCCAL
Status: DISCONTINUED | OUTPATIENT
Start: 2021-12-29 | End: 2021-12-31 | Stop reason: HOSPADM

## 2021-12-29 RX ORDER — SODIUM CHLORIDE, SODIUM LACTATE, POTASSIUM CHLORIDE, CALCIUM CHLORIDE 600; 310; 30; 20 MG/100ML; MG/100ML; MG/100ML; MG/100ML
INJECTION, SOLUTION INTRAVENOUS CONTINUOUS
Status: DISCONTINUED | OUTPATIENT
Start: 2021-12-29 | End: 2021-12-29

## 2021-12-29 RX ORDER — BISACODYL 10 MG
10 SUPPOSITORY, RECTAL RECTAL DAILY PRN
Status: DISCONTINUED | OUTPATIENT
Start: 2021-12-31 | End: 2021-12-31 | Stop reason: HOSPADM

## 2021-12-29 RX ORDER — NALOXONE HYDROCHLORIDE 0.4 MG/ML
0.4 INJECTION, SOLUTION INTRAMUSCULAR; INTRAVENOUS; SUBCUTANEOUS
Status: DISCONTINUED | OUTPATIENT
Start: 2021-12-29 | End: 2021-12-31 | Stop reason: HOSPADM

## 2021-12-29 RX ORDER — MORPHINE SULFATE 1 MG/ML
INJECTION, SOLUTION EPIDURAL; INTRATHECAL; INTRAVENOUS
Status: COMPLETED | OUTPATIENT
Start: 2021-12-29 | End: 2021-12-29

## 2021-12-29 RX ORDER — DEXAMETHASONE SODIUM PHOSPHATE 4 MG/ML
INJECTION, SOLUTION INTRA-ARTICULAR; INTRALESIONAL; INTRAMUSCULAR; INTRAVENOUS; SOFT TISSUE PRN
Status: DISCONTINUED | OUTPATIENT
Start: 2021-12-29 | End: 2021-12-29

## 2021-12-29 RX ORDER — MISOPROSTOL 200 UG/1
400 TABLET ORAL
Status: DISCONTINUED | OUTPATIENT
Start: 2021-12-29 | End: 2021-12-31 | Stop reason: HOSPADM

## 2021-12-29 RX ORDER — AMOXICILLIN 250 MG
2 CAPSULE ORAL 2 TIMES DAILY
Status: DISCONTINUED | OUTPATIENT
Start: 2021-12-29 | End: 2021-12-31 | Stop reason: HOSPADM

## 2021-12-29 RX ORDER — METHYLERGONOVINE MALEATE 0.2 MG/ML
200 INJECTION INTRAVENOUS
Status: DISCONTINUED | OUTPATIENT
Start: 2021-12-29 | End: 2021-12-31 | Stop reason: HOSPADM

## 2021-12-29 RX ORDER — OXYTOCIN/0.9 % SODIUM CHLORIDE 30/500 ML
340 PLASTIC BAG, INJECTION (ML) INTRAVENOUS CONTINUOUS PRN
Status: DISCONTINUED | OUTPATIENT
Start: 2021-12-29 | End: 2021-12-29

## 2021-12-29 RX ORDER — MISOPROSTOL 200 UG/1
800 TABLET ORAL
Status: DISCONTINUED | OUTPATIENT
Start: 2021-12-29 | End: 2021-12-29

## 2021-12-29 RX ORDER — KETOROLAC TROMETHAMINE 30 MG/ML
30 INJECTION, SOLUTION INTRAMUSCULAR; INTRAVENOUS EVERY 6 HOURS
Status: COMPLETED | OUTPATIENT
Start: 2021-12-29 | End: 2021-12-30

## 2021-12-29 RX ORDER — CEFAZOLIN SODIUM 2 G/100ML
2 INJECTION, SOLUTION INTRAVENOUS SEE ADMIN INSTRUCTIONS
Status: DISCONTINUED | OUTPATIENT
Start: 2021-12-29 | End: 2021-12-29

## 2021-12-29 RX ORDER — FENTANYL CITRATE-0.9 % NACL/PF 10 MCG/ML
100 PLASTIC BAG, INJECTION (ML) INTRAVENOUS EVERY 5 MIN PRN
Status: DISCONTINUED | OUTPATIENT
Start: 2021-12-29 | End: 2021-12-31 | Stop reason: CLARIF

## 2021-12-29 RX ORDER — DEXTROSE MONOHYDRATE 25 G/50ML
25-50 INJECTION, SOLUTION INTRAVENOUS
Status: DISCONTINUED | OUTPATIENT
Start: 2021-12-29 | End: 2021-12-31 | Stop reason: HOSPADM

## 2021-12-29 RX ORDER — HYDROCORTISONE 2.5 %
CREAM (GRAM) TOPICAL 3 TIMES DAILY PRN
Status: DISCONTINUED | OUTPATIENT
Start: 2021-12-29 | End: 2021-12-31 | Stop reason: HOSPADM

## 2021-12-29 RX ORDER — MISOPROSTOL 200 UG/1
400 TABLET ORAL
Status: DISCONTINUED | OUTPATIENT
Start: 2021-12-29 | End: 2021-12-29

## 2021-12-29 RX ORDER — MISOPROSTOL 200 UG/1
800 TABLET ORAL
Status: DISCONTINUED | OUTPATIENT
Start: 2021-12-29 | End: 2021-12-31 | Stop reason: HOSPADM

## 2021-12-29 RX ORDER — NALOXONE HYDROCHLORIDE 0.4 MG/ML
0.2 INJECTION, SOLUTION INTRAMUSCULAR; INTRAVENOUS; SUBCUTANEOUS
Status: DISCONTINUED | OUTPATIENT
Start: 2021-12-29 | End: 2021-12-31 | Stop reason: HOSPADM

## 2021-12-29 RX ORDER — CITRIC ACID/SODIUM CITRATE 334-500MG
30 SOLUTION, ORAL ORAL
Status: COMPLETED | OUTPATIENT
Start: 2021-12-29 | End: 2021-12-29

## 2021-12-29 RX ORDER — MAGNESIUM HYDROXIDE 1200 MG/15ML
LIQUID ORAL PRN
Status: DISCONTINUED | OUTPATIENT
Start: 2021-12-29 | End: 2021-12-29

## 2021-12-29 RX ORDER — MODIFIED LANOLIN
OINTMENT (GRAM) TOPICAL
Status: DISCONTINUED | OUTPATIENT
Start: 2021-12-29 | End: 2021-12-31 | Stop reason: HOSPADM

## 2021-12-29 RX ORDER — CEFAZOLIN SODIUM 2 G/100ML
2 INJECTION, SOLUTION INTRAVENOUS
Status: COMPLETED | OUTPATIENT
Start: 2021-12-29 | End: 2021-12-29

## 2021-12-29 RX ORDER — LIDOCAINE 40 MG/G
CREAM TOPICAL
Status: DISCONTINUED | OUTPATIENT
Start: 2021-12-29 | End: 2021-12-31 | Stop reason: HOSPADM

## 2021-12-29 RX ORDER — OXYTOCIN 10 [USP'U]/ML
10 INJECTION, SOLUTION INTRAMUSCULAR; INTRAVENOUS
Status: DISCONTINUED | OUTPATIENT
Start: 2021-12-29 | End: 2021-12-31 | Stop reason: HOSPADM

## 2021-12-29 RX ORDER — ACETAMINOPHEN 325 MG/1
975 TABLET ORAL ONCE
Status: COMPLETED | OUTPATIENT
Start: 2021-12-29 | End: 2021-12-29

## 2021-12-29 RX ORDER — ONDANSETRON 4 MG/1
4 TABLET, ORALLY DISINTEGRATING ORAL EVERY 6 HOURS PRN
Status: DISCONTINUED | OUTPATIENT
Start: 2021-12-29 | End: 2021-12-31 | Stop reason: HOSPADM

## 2021-12-29 RX ORDER — METHYLERGONOVINE MALEATE 0.2 MG/ML
200 INJECTION INTRAVENOUS
Status: DISCONTINUED | OUTPATIENT
Start: 2021-12-29 | End: 2021-12-29

## 2021-12-29 RX ORDER — ACETAMINOPHEN 325 MG/1
975 TABLET ORAL EVERY 6 HOURS
Status: DISCONTINUED | OUTPATIENT
Start: 2021-12-29 | End: 2021-12-31 | Stop reason: HOSPADM

## 2021-12-29 RX ORDER — OXYCODONE HYDROCHLORIDE 5 MG/1
5 TABLET ORAL EVERY 4 HOURS PRN
Status: DISCONTINUED | OUTPATIENT
Start: 2021-12-29 | End: 2021-12-31 | Stop reason: HOSPADM

## 2021-12-29 RX ORDER — TRANEXAMIC ACID 10 MG/ML
1 INJECTION, SOLUTION INTRAVENOUS EVERY 30 MIN PRN
Status: DISCONTINUED | OUTPATIENT
Start: 2021-12-29 | End: 2021-12-31 | Stop reason: HOSPADM

## 2021-12-29 RX ORDER — BUPIVACAINE HYDROCHLORIDE 7.5 MG/ML
INJECTION, SOLUTION INTRASPINAL
Status: COMPLETED | OUTPATIENT
Start: 2021-12-29 | End: 2021-12-29

## 2021-12-29 RX ORDER — IBUPROFEN 800 MG/1
800 TABLET, FILM COATED ORAL EVERY 6 HOURS
Status: DISCONTINUED | OUTPATIENT
Start: 2021-12-30 | End: 2021-12-31 | Stop reason: HOSPADM

## 2021-12-29 RX ORDER — ONDANSETRON 2 MG/ML
INJECTION INTRAMUSCULAR; INTRAVENOUS PRN
Status: DISCONTINUED | OUTPATIENT
Start: 2021-12-29 | End: 2021-12-29

## 2021-12-29 RX ORDER — NALBUPHINE HYDROCHLORIDE 10 MG/ML
2.5-5 INJECTION, SOLUTION INTRAMUSCULAR; INTRAVENOUS; SUBCUTANEOUS EVERY 6 HOURS PRN
Status: DISCONTINUED | OUTPATIENT
Start: 2021-12-29 | End: 2021-12-31 | Stop reason: CLARIF

## 2021-12-29 RX ORDER — DIPHENHYDRAMINE HYDROCHLORIDE 50 MG/ML
25 INJECTION INTRAMUSCULAR; INTRAVENOUS EVERY 6 HOURS PRN
Status: DISCONTINUED | OUTPATIENT
Start: 2021-12-29 | End: 2021-12-31 | Stop reason: HOSPADM

## 2021-12-29 RX ORDER — SODIUM CHLORIDE, SODIUM LACTATE, POTASSIUM CHLORIDE, CALCIUM CHLORIDE 600; 310; 30; 20 MG/100ML; MG/100ML; MG/100ML; MG/100ML
INJECTION, SOLUTION INTRAVENOUS CONTINUOUS PRN
Status: DISCONTINUED | OUTPATIENT
Start: 2021-12-29 | End: 2021-12-29

## 2021-12-29 RX ORDER — DIPHENHYDRAMINE HCL 25 MG
25 CAPSULE ORAL EVERY 6 HOURS PRN
Status: DISCONTINUED | OUTPATIENT
Start: 2021-12-29 | End: 2021-12-31 | Stop reason: HOSPADM

## 2021-12-29 RX ORDER — OXYTOCIN/0.9 % SODIUM CHLORIDE 30/500 ML
100-340 PLASTIC BAG, INJECTION (ML) INTRAVENOUS CONTINUOUS PRN
Status: DISCONTINUED | OUTPATIENT
Start: 2021-12-29 | End: 2021-12-29

## 2021-12-29 RX ORDER — CARBOPROST TROMETHAMINE 250 UG/ML
250 INJECTION, SOLUTION INTRAMUSCULAR
Status: DISCONTINUED | OUTPATIENT
Start: 2021-12-29 | End: 2021-12-29

## 2021-12-29 RX ORDER — SIMETHICONE 80 MG
80 TABLET,CHEWABLE ORAL 4 TIMES DAILY PRN
Status: DISCONTINUED | OUTPATIENT
Start: 2021-12-29 | End: 2021-12-31 | Stop reason: HOSPADM

## 2021-12-29 RX ADMIN — SENNOSIDES AND DOCUSATE SODIUM 1 TABLET: 8.6; 5 TABLET ORAL at 21:01

## 2021-12-29 RX ADMIN — ONDANSETRON 4 MG: 2 INJECTION INTRAMUSCULAR; INTRAVENOUS at 10:57

## 2021-12-29 RX ADMIN — SODIUM CHLORIDE, POTASSIUM CHLORIDE, SODIUM LACTATE AND CALCIUM CHLORIDE: 600; 310; 30; 20 INJECTION, SOLUTION INTRAVENOUS at 09:24

## 2021-12-29 RX ADMIN — METHIMAZOLE 7.5 MG: 5 TABLET ORAL at 22:10

## 2021-12-29 RX ADMIN — DEXAMETHASONE SODIUM PHOSPHATE 4 MG: 4 INJECTION, SOLUTION INTRA-ARTICULAR; INTRALESIONAL; INTRAMUSCULAR; INTRAVENOUS; SOFT TISSUE at 10:57

## 2021-12-29 RX ADMIN — SODIUM CHLORIDE, POTASSIUM CHLORIDE, SODIUM LACTATE AND CALCIUM CHLORIDE: 600; 310; 30; 20 INJECTION, SOLUTION INTRAVENOUS at 10:43

## 2021-12-29 RX ADMIN — Medication 50 MCG/MIN: at 10:50

## 2021-12-29 RX ADMIN — BUPIVACAINE HYDROCHLORIDE IN DEXTROSE 1.6 ML: 7.5 INJECTION, SOLUTION SUBARACHNOID at 10:50

## 2021-12-29 RX ADMIN — SODIUM CITRATE AND CITRIC ACID MONOHYDRATE 30 ML: 500; 334 SOLUTION ORAL at 09:33

## 2021-12-29 RX ADMIN — SODIUM CHLORIDE, SODIUM LACTATE, POTASSIUM CHLORIDE, CALCIUM CHLORIDE AND DEXTROSE MONOHYDRATE: 5; 600; 310; 30; 20 INJECTION, SOLUTION INTRAVENOUS at 19:24

## 2021-12-29 RX ADMIN — FENTANYL CITRATE 15 MCG: 50 INJECTION, SOLUTION INTRAMUSCULAR; INTRAVENOUS at 10:50

## 2021-12-29 RX ADMIN — CEFAZOLIN SODIUM 2 G: 2 INJECTION, SOLUTION INTRAVENOUS at 09:33

## 2021-12-29 RX ADMIN — OXYTOCIN-SODIUM CHLORIDE 0.9% IV SOLN 30 UNIT/500ML 500 ML/HR: 30-0.9/5 SOLUTION at 11:21

## 2021-12-29 RX ADMIN — KETOROLAC TROMETHAMINE 30 MG: 30 INJECTION, SOLUTION INTRAMUSCULAR at 11:57

## 2021-12-29 RX ADMIN — ACETAMINOPHEN 975 MG: 325 TABLET, FILM COATED ORAL at 15:30

## 2021-12-29 RX ADMIN — Medication 0.2 MG: at 10:50

## 2021-12-29 RX ADMIN — ACETAMINOPHEN 975 MG: 325 TABLET, FILM COATED ORAL at 21:01

## 2021-12-29 RX ADMIN — ACETAMINOPHEN 975 MG: 325 TABLET, FILM COATED ORAL at 09:33

## 2021-12-29 RX ADMIN — KETOROLAC TROMETHAMINE 30 MG: 30 INJECTION, SOLUTION INTRAMUSCULAR at 19:13

## 2021-12-29 RX ADMIN — SENNOSIDES AND DOCUSATE SODIUM 1 TABLET: 8.6; 5 TABLET ORAL at 15:30

## 2021-12-29 RX ADMIN — PHENYLEPHRINE HYDROCHLORIDE 100 MCG: 10 INJECTION INTRAVENOUS at 11:35

## 2021-12-29 ASSESSMENT — ACTIVITIES OF DAILY LIVING (ADL)
ADLS_ACUITY_SCORE: 3

## 2021-12-29 NOTE — PLAN OF CARE
Data: Bettina Oneil transferred to [facility] via cart at 1400. Baby transferred via parent's arms.  Action: Receiving unit notified of transfer: Yes. Patient and family notified of room change. Report given to Anne Marie DEJESUS at 1400. Belongings sent to receiving unit. Accompanied by Registered Nurse. Oriented patient to surroundings. Call light within reach. ID bands double-checked with receiving RN.  Response: Patient tolerated transfer and is stable.

## 2021-12-29 NOTE — PLAN OF CARE
Data: [facility] presented to [facility]: 2021  8:52 AM.  Reason for maternal/fetal assessment is scheduled c/s.  Patient is a .  Prenatal record reviewed. Pregnancy  has been complicated by gestational diabetes, insulin controlled, and hyperthyroidism.  Gestational Age 39w1d. VSS. Fetal movement active. Patient denies uterine contractions, leaking of vaginal fluid/rupture of membranes, vaginal bleeding, abdominal pain, pelvic pressure, nausea, vomiting, headache, visual disturbances, epigastric or URQ pain, significant edema. Support person is present.   Action: Verbal consent for EFM. Triage assessment completed. Bill of rights reviewed.  Response: Patient verbalized agreement with plan. Will contact Dr Celeste Moser with update and for further orders.

## 2021-12-29 NOTE — OP NOTE
[facility]  Obstetrics Operative Report    Surgery Date:  2021  Surgeon(s): Celeste Moser MD      Preoperative Diagnoses:  Intrauterine pregnancy at 39w1d  Breech presentation  History of  section  GDMA2    Postoperative diagnoses: Same     Procedure performed:  Repeat low segment transverse  section     Anesthesia:  Spinal with duramorph  Est Blood Loss (mL): 566 mL  Fluid replacement: see anesthesia record   Urine output: 300  Specimens: cord blood  Complications: None apparent    Operative findings:   1. Single viable male infant at 1119 hours on 21. Apgars of 8 and 9 at one and five minutes.  Birth weight: 7lb 10oz.  Fetal presentation: breech. Amniotic fluid: clear. Double nuchal cord. Placenta intact with 3 vessel cord.    2.Normal appearing uterus, fallopian tubes, ovaries.    3. Mild rectofascial adhesions. No intraabdominal adhesions.      Indication: Bettina Oneil is a 36 year old  at 39w1d who was admitted for scheduled repeat  section with fetus in breech presentation. Pregnancy otherwise complicated by GDMA2 on insulin, hyperthyroidism on methimazole. The risks, benefits, and alternatives of  delivery were explained and the patient agreed to proceed.     Procedure details:  After obtaining informed consent the patient was taken to the operating room. A safety patient time out was performed prior to the procedure. SCD's were placed. The patient received spinal anesthesia with duramoprh prior to the skin incision. A phillips was placed. She was placed in the dorsal supine position with a leftward tilt and prepped and draped in the usual sterile fashion. Nursing staff were present and available for baby.     Following test of adequate anesthesia, the abdomen was entered through a Pfannenstiel skin incision through her previous scar. The skin incision was made sharply and carried through the subcutaneous tissue to the fascia.  Fascia was  incised in the midline and extended laterally with the Cameron scissors.  The superior margin of the fascial incision was grasped with Kocher clamps and dissected from the underlying muscle sharp and blunt dissecton, which was then repeated at the lower margin of the fascial incision.  The muscle was  in the midline.  The peritoneum was entered bluntly and the opening extended by digital dissection with care to avoid the bladder. The large Umang-0 was placed. The vesicouterine peritoneum was entered sharply with Metzenbaum scissors and incision extended laterally. The bladder flap was created digitally. The lower segment of the uterus was opened sharply in a transverse fashion and extended with digital pressure.    The infant was in adalberto breech presentation with sacrum anterior position and was delivered atraumatically with standard breech maneuvers. Infant sacrum elevated and delivered through hysterotomy. Right leg flexed and extended out followed by left leg flexed and extended out. Infant delivered to scapulae each arm flexed and delivered. Head flexed and delivered atraumatically. After approximately 30 seconds, the cord was doubly clamped and cut due to poor tone and the infant was handed off to the waiting nursing staff. With minimal stimulation the infant had excellent tone and cry.     The placenta was extracted with fundal massage and cord traction.  The uterus was cleared of all clots and debris.  The uterus was massaged and was noted to be firm.  Oxytocin was given through the running IV.  With vigorous massage as well as administration of oxytocin, good uterine tone was achieved. The hysterotomy was repaired with 0-vicryl suture in a running locked fashion. A 2nd layer of 0-vicryl was  used to imbricate the incision and good hemostasis was achieved. The bladder flap was inspected and found to be hemostatic.  The bilateral pericolic gutters were irrigated and suctioned.  The Umang-O was removed.  The hysterotomy was again inspected and found to have no active sites of bleeding.  The abdominal wall was examined and found to have no active sites of bleeding.      The fascia was closed with a running suture of 0-vicryl.  Subcutaneous tissue was irrigated. Areas that were oozing were controlled with cautery. The subcutaneous tissue was reapproximated with 2-0 vicryl. The skin was closed with 4-0 vicryl. Steri-strips and a bandage were placed over the incision.    The patient tolerated the procedure well and was taken to the recovery room in stable condition. All sponge, needle and instrument counts were correct x2.     Celeste Moser MD   [facility]  12/29/2021 12:12 PM

## 2021-12-29 NOTE — ANESTHESIA PREPROCEDURE EVALUATION
Anesthesia Pre-Procedure Evaluation    Patient: Bettina Oneil   MRN: [id number] : 1985        Preoperative Diagnosis: Previous  delivery affecting pregnancy [O34.219]    Procedure : Procedure(s):  REPEAT  SECTION          Past Medical History:   Diagnosis Date     NO ACTIVE PROBLEMS      Thyroid disease       Past Surgical History:   Procedure Laterality Date     ABDOMEN SURGERY       NO HISTORY OF SURGERY        No Known Allergies   Social History     Tobacco Use     Smoking status: Never Smoker     Smokeless tobacco: Never Used   Substance Use Topics     Alcohol use: Not Currently     Comment: socially once in a while       Wt Readings from Last 1 Encounters:   18 54.4 kg (120 lb)        Anesthesia Evaluation            ROS/MED HX  ENT/Pulmonary:  - neg pulmonary ROS     Neurologic:       Cardiovascular:  - neg cardiovascular ROS     METS/Exercise Tolerance:     Hematologic:       Musculoskeletal:       GI/Hepatic:       Renal/Genitourinary:       Endo:       Psychiatric/Substance Use:       Infectious Disease:       Malignancy:       Other:      (+) , previous  (x1),         Physical Exam    Airway        Mallampati: II   TM distance: > 3 FB   Neck ROM: full     Respiratory Devices and Support         Dental           Cardiovascular   cardiovascular exam normal          Pulmonary   pulmonary exam normal                OUTSIDE LABS:  CBC:   Lab Results   Component Value Date    WBC 6.5 01/15/2015    WBC 5.1 2011    HGB 13.2 2021    HGB 13.5 01/15/2015    HCT 40.4 01/15/2015    HCT 40.7 2011     01/15/2015     2011     BMP:   Lab Results   Component Value Date     01/15/2015     2011    POTASSIUM 3.9 01/15/2015    POTASSIUM 4.3 2011    CHLORIDE 106 01/15/2015    CHLORIDE 105 2011    CO2 22 01/15/2015    CO2 25 2011    BUN 12 01/15/2015    BUN 13 2011    CR 0.59 01/15/2015    CR 0.61 2011     GLC 92 01/15/2015    GLC 85 11/28/2011     COAGS: No results found for: PTT, INR, FIBR  POC:   Lab Results   Component Value Date    HCG  08/17/2009     Negative   This test provides a presumptive diagnosis of pregnancy. A confirmed pregnancy   diagnosis should only be made by a physician after all clinical and laboratory   findings have been evaluated.     HEPATIC: No results found for: ALBUMIN, PROTTOTAL, ALT, AST, GGT, ALKPHOS, BILITOTAL, BILIDIRECT, VIN  OTHER:   Lab Results   Component Value Date    KRUPA 9.1 01/15/2015    TSH 0.05 (L) 05/11/2006    T4 1.21 05/11/2006       Anesthesia Plan    ASA Status:  2      Anesthesia Type: Spinal.              Consents    Anesthesia Plan(s) and associated risks, benefits, and realistic alternatives discussed. Questions answered and patient/representative(s) expressed understanding.    - Discussed:     - Discussed with:  Patient, Spouse         Postoperative Care            Comments:                Kenia Peralta MD

## 2021-12-29 NOTE — ANESTHESIA POSTPROCEDURE EVALUATION
Patient: Bettina Oneil    Procedure: Procedure(s):  REPEAT  SECTION       Diagnosis:Previous  delivery affecting pregnancy [O34.219]  Diagnosis Additional Information: No value filed.    Anesthesia Type:  Spinal    Note:  Disposition: Inpatient   Postop Pain Control: Uneventful            Sign Out: Well controlled pain   PONV: No   Neuro/Psych: Uneventful            Sign Out: Acceptable/Baseline neuro status   Airway/Respiratory: Uneventful            Sign Out: Acceptable/Baseline resp. status   CV/Hemodynamics: Uneventful            Sign Out: Acceptable CV status; No obvious hypovolemia; No obvious fluid overload   Other NRE: NONE   DID A NON-ROUTINE EVENT OCCUR? No           Last vitals:  Vitals Value Taken Time   BP 99/57 21 1235   Temp 97.6  F (36.4  C) 21 1200   Pulse     Resp 14 21 1200   SpO2 94 % 21 1232   Vitals shown include unvalidated device data.    Electronically Signed By: Kenia Peralta MD  2021  12:37 PM

## 2021-12-29 NOTE — ANESTHESIA PROCEDURE NOTES
Intrathecal injection Procedure Note    Pre-Procedure   Staff -        Anesthesiologist:  Kenia Peralta MD       Performed By: anesthesiologist       Location: OR       Procedure Start/Stop Times: 12/29/2021 10:48 AM and 12/29/2021 10:50 AM       Pre-Anesthestic Checklist: patient identified, IV checked, risks and benefits discussed, informed consent, monitors and equipment checked, pre-op evaluation and at physician/surgeon's request  Timeout:       Correct Patient: Yes        Correct Procedure: Yes        Correct Site: Yes        Correct Position: Yes   Procedure Documentation  Procedure: intrathecal injection       Diagnosis: repeat c section       Patient Position: sitting       Skin prep: Betadine       Insertion Site: L3-4. (midline approach).       Needle Gauge: 25.        Needle Length (Inches): 3.5        Spinal Needle Type: Pencan       Introducer used       Introducer: 20 G       # of attempts: 1 and  # of redirects:  0    Assessment/Narrative         Paresthesias: No.       Sensory Level: T6       CSF fluid: clear.    Medication(s) Administered   0.75% Hyperbaric Bupivacaine (Intrathecal), 1.6 mL  Morphine PF 1 mg/mL (Intrathecal), 0.2 mg  Fentanyl PF (Intrathecal), 15 mcg  Medication Administration Time: 12/29/2021 10:50 AM

## 2021-12-29 NOTE — ANESTHESIA CARE TRANSFER NOTE
Patient: Bettina Oneil    Procedure: Procedure(s):  REPEAT  SECTION       Diagnosis: Previous  delivery affecting pregnancy [O34.219]  Diagnosis Additional Information: No value filed.    Anesthesia Type:   Spinal     Note:    Oropharynx: oropharynx clear of all foreign objects and spontaneously breathing  Level of Consciousness: awake  Oxygen Supplementation: room air    Independent Airway: airway patency satisfactory and stable  Dentition: dentition unchanged  Vital Signs Stable: post-procedure vital signs reviewed and stable  Report to RN Given: handoff report given  Patient transferred to: Labor and Delivery  Comments: Pt to recovery.   Report given to RN.    Handoff Report: Identifed the Patient, Identified the Reponsible Provider, Reviewed the pertinent medical history, Discussed the surgical course, Reviewed Intra-OP anesthesia mangement and issues during anesthesia, Set expectations for post-procedure period and Allowed opportunity for questions and acknowledgement of understanding      Vitals:  Vitals Value Taken Time   /55 21 1204   Temp     Pulse     Resp     SpO2 99 % 21 1202   Vitals shown include unvalidated device data.    Electronically Signed By: MATIAS Waite CRNA  2021  12:05 PM

## 2021-12-30 LAB
GLUCOSE BLDC GLUCOMTR-MCNC: 75 MG/DL (ref 70–99)
HGB BLD-MCNC: 10.9 G/DL (ref 11.7–15.7)

## 2021-12-30 PROCEDURE — 250N000011 HC RX IP 250 OP 636: Performed by: OBSTETRICS & GYNECOLOGY

## 2021-12-30 PROCEDURE — 85018 HEMOGLOBIN: CPT | Performed by: OBSTETRICS & GYNECOLOGY

## 2021-12-30 PROCEDURE — 36415 COLL VENOUS BLD VENIPUNCTURE: CPT | Performed by: OBSTETRICS & GYNECOLOGY

## 2021-12-30 PROCEDURE — 250N000013 HC RX MED GY IP 250 OP 250 PS 637: Performed by: OBSTETRICS & GYNECOLOGY

## 2021-12-30 PROCEDURE — 120N000001 HC R&B MED SURG/OB

## 2021-12-30 RX ADMIN — ACETAMINOPHEN 975 MG: 325 TABLET, FILM COATED ORAL at 15:52

## 2021-12-30 RX ADMIN — SENNOSIDES AND DOCUSATE SODIUM 2 TABLET: 50; 8.6 TABLET ORAL at 09:01

## 2021-12-30 RX ADMIN — ACETAMINOPHEN 975 MG: 325 TABLET, FILM COATED ORAL at 03:47

## 2021-12-30 RX ADMIN — KETOROLAC TROMETHAMINE 30 MG: 30 INJECTION, SOLUTION INTRAMUSCULAR at 01:12

## 2021-12-30 RX ADMIN — IBUPROFEN 800 MG: 800 TABLET, FILM COATED ORAL at 19:48

## 2021-12-30 RX ADMIN — ACETAMINOPHEN 975 MG: 325 TABLET, FILM COATED ORAL at 09:36

## 2021-12-30 RX ADMIN — KETOROLAC TROMETHAMINE 30 MG: 30 INJECTION, SOLUTION INTRAMUSCULAR at 06:52

## 2021-12-30 RX ADMIN — ACETAMINOPHEN 975 MG: 325 TABLET, FILM COATED ORAL at 20:58

## 2021-12-30 RX ADMIN — IBUPROFEN 800 MG: 800 TABLET, FILM COATED ORAL at 13:24

## 2021-12-30 RX ADMIN — METHIMAZOLE 7.5 MG: 5 TABLET ORAL at 20:58

## 2021-12-30 ASSESSMENT — ACTIVITIES OF DAILY LIVING (ADL)
ADLS_ACUITY_SCORE: 3

## 2021-12-30 NOTE — PLAN OF CARE
VSS, minimal incision pain controlled with Tylenol and Toradol. Had been eating and drinking very well until she attempted to get out of bed. She became nauseated and had a very large emesis of 1500cc. Declined nausea meds stating she felt fine after. Was able to continue PO intake but then became nauseated when up for the second time. D5LR restarted to maintain hydration. Has attempted to void x2 with no success. FFU, minimal rubra. Pt wants to walk around room and drink more fluids and try again to void. Will cont to monitor.

## 2021-12-30 NOTE — PLAN OF CARE
Vital signs stable. Pain managed with Tylenol and Toradol/Ibuprofen.  Pt is now able to void. Pt is ambulating on her own and independent in cares for self. Breastfeeding baby, going well. Incision is approximated with steri-strips, no drainage or signs of infection noted.  at bedside and supportive. Bonding well with baby.

## 2021-12-30 NOTE — PLAN OF CARE
Patient's catheter was removed at 1400 yesterday and patient has not been able to void on own. Attempted peppermint, running water and showering without results. Straight catheterized x 2 with 900 ml the first time and 1000 the second time. Received 1 liter of D5 LR and has been drinking lots of water. Continue to monitor. Breast feeding independently. Up and walking in room independently. Monitor.

## 2021-12-30 NOTE — PROGRESS NOTES
Post-partum Note      S: Patient is doing well today.  Pain is controlled with PO medications.  Tolerating regular diet without nausea or vomiting.  Ambulating without dizziness.. Lochia normal.  Breastfeeding, going well. Having some difficulty voiding, had to have a straight cath 2x since afternoon yesterday. Was able to void a small amount this morning.     O:   Patient Vitals for the past 24 hrs:   BP Temp Temp src Pulse Resp SpO2   12/30/21 0721 104/65 97.7  F (36.5  C) Oral 81 16 96 %   12/30/21 0347 118/66 97.8  F (36.6  C) Oral 75 16 --   12/30/21 0112 105/75 97.4  F (36.3  C) Oral 85 16 98 %   12/29/21 2101 118/73 98.1  F (36.7  C) Oral 91 16 97 %   12/29/21 1900 111/77 -- -- 88 16 99 %   12/29/21 1800 114/88 -- -- 94 16 99 %   12/29/21 1700 112/78 -- -- 94 16 100 %   12/29/21 1600 115/68 -- -- 91 16 100 %   12/29/21 1500 117/72 97.7  F (36.5  C) Oral 90 16 99 %   12/29/21 1430 120/73 -- -- -- -- --   12/29/21 1359 104/55 -- -- -- -- --   12/29/21 1349 113/71 -- -- -- -- --   12/29/21 1333 110/59 -- -- -- -- --   12/29/21 1322 -- -- -- -- -- 96 %   12/29/21 1318 104/62 -- -- -- -- --   12/29/21 1310 97/62 -- -- -- -- 93 %   12/29/21 1303 97/62 -- -- -- -- --   12/29/21 1248 108/61 -- -- -- -- --   12/29/21 1235 99/57 -- -- -- -- --   12/29/21 1232 -- -- -- -- -- 94 %   12/29/21 1227 -- -- -- -- -- 94 %   12/29/21 1218 107/53 -- -- -- -- --   12/29/21 1200 108/55 97.6  F (36.4  C) Oral -- 14 99 %   12/29/21 0900 128/80 98.5  F (36.9  C) Oral 120 16 --     Gen:  Resting comfortably, NAD  Pulm:  Breathing comfortably on room air  Abd:  Soft, appropriately ttp, non-distended.Fundus at umbilicus, firm and non-tender.  Incision: c/d/i with sutures and steris   Ext:  non-tender, tr bilateral LE edema    I/O last 3 completed shifts:  In: 1500 [P.O.:1000; I.V.:500]  Out: 4666 [Urine:2600; Emesis/NG output:1500; Blood:566]    Hgb:     Hemoglobin   Date Value Ref Range Status   12/30/2021 10.9 (L) 11.7 - 15.7 g/dL  Final   2021 13.2 11.7 - 15.7 g/dL Final   01/15/2015 13.5 11.7 - 15.7 g/dL Final   2011 13.8 11.7 - 15.7 g/dL Final       Assessment/Plan:  36 year old  on POD1 after repeat LTCS for history of prior c/s, breech presentation.  Pregnancy complicated with   -hyperthyroidism, on methimazole  -GDMA2    1. Continue with routine postpartum management  2. Incision is c/d/I with sutures  3. EBL: 566ml ; pre hemoglobin 13.2, post hemogobin 10.9, no symptoms of anemia.   4. A+, rubella immune   5. Feed: Breastfeeding  6. CV/RESP: vss  7. DVT PPX: ambulation  8. : has not been able to void spontaneously, has been straight cathed x 2, voided small amount this am. Will watch and if unable to void again, plan for phillips x 12 hours.   9. Hyperthyroidism, has post partum visit in 2022 with endo, plan for same dose of methimazole until that visit   10. gDMA2- fasting this am 75. Okay to stop blood sugars.    Dispo: Anticipate DC home POD2      MD [facility]  2021, 8:53 AM

## 2021-12-31 VITALS
HEART RATE: 89 BPM | DIASTOLIC BLOOD PRESSURE: 75 MMHG | RESPIRATION RATE: 18 BRPM | OXYGEN SATURATION: 96 % | TEMPERATURE: 97.5 F | SYSTOLIC BLOOD PRESSURE: 117 MMHG

## 2021-12-31 PROBLEM — D62 ANEMIA DUE TO BLOOD LOSS, ACUTE: Status: ACTIVE | Noted: 2021-12-31

## 2021-12-31 PROBLEM — E05.90 HYPERTHYROIDISM: Status: ACTIVE | Noted: 2021-12-31

## 2021-12-31 PROCEDURE — 250N000013 HC RX MED GY IP 250 OP 250 PS 637: Performed by: OBSTETRICS & GYNECOLOGY

## 2021-12-31 RX ORDER — FERROUS SULFATE 325(65) MG
325 TABLET ORAL
Qty: 30 TABLET | COMMUNITY
Start: 2021-12-31

## 2021-12-31 RX ORDER — ACETAMINOPHEN 500 MG
1000 TABLET ORAL EVERY 6 HOURS PRN
COMMUNITY
Start: 2021-12-31

## 2021-12-31 RX ORDER — IBUPROFEN 200 MG
600 TABLET ORAL EVERY 6 HOURS PRN
COMMUNITY
Start: 2021-12-31

## 2021-12-31 RX ORDER — OXYCODONE HYDROCHLORIDE 5 MG/1
5 TABLET ORAL EVERY 4 HOURS PRN
Qty: 16 TABLET | Refills: 0 | Status: SHIPPED | OUTPATIENT
Start: 2021-12-31

## 2021-12-31 RX ADMIN — IBUPROFEN 800 MG: 800 TABLET, FILM COATED ORAL at 07:38

## 2021-12-31 RX ADMIN — ACETAMINOPHEN 975 MG: 325 TABLET, FILM COATED ORAL at 03:56

## 2021-12-31 RX ADMIN — ACETAMINOPHEN 975 MG: 325 TABLET, FILM COATED ORAL at 09:56

## 2021-12-31 RX ADMIN — IBUPROFEN 800 MG: 800 TABLET, FILM COATED ORAL at 01:32

## 2021-12-31 ASSESSMENT — ACTIVITIES OF DAILY LIVING (ADL)
ADLS_ACUITY_SCORE: 3

## 2021-12-31 NOTE — PLAN OF CARE
VSS, pain controlled with pharmacological interventions, discharging early with baby, medication filled and given for home use; discharge education completed; explained follow up with OB MD in 6 weeks, home care explained and faxed.

## 2021-12-31 NOTE — PROGRESS NOTES
OB Post-op  Section Progress Note POD# 2    S:  Patient doing well.  Pain well controlled with ibuprofen and Tylenol pain medication.  Ambulating.  Tolerating regular diet.  No N/V.  Passing flatus.  Voiding without issue.  Bleeding minimal.  Breastfeeding.    O:  /75   Pulse 89   Temp 97.5  F (36.4  C) (Oral)   Resp 18   LMP 2021   SpO2 96%   Breastfeeding Unknown   Gen- A&O, NAD  Abd- soft, non-tender, +BS, no rebound or guarding, fundus firm at umbilicus  Incision- C/D/I (steri strips)  Ext- non-tender, no edema.     Hemoglobin   Date Value Ref Range Status   2021 10.9 (L) 11.7 - 15.7 g/dL Final   01/15/2015 13.5 11.7 - 15.7 g/dL Final     A POS   Rubella immune    A/P:  36 year old  POD# 2 s/p RLTCS for previous , breech. Pregnancy complicated with   -hyperthyroidism, on methimazole  -GDMA2    1.  Routine post-op cares  2.  Heme: acute blood loss anemia with EBL: 566ml ; pre hemoglobin 13.2, post hemogobin 10.9, home on oral iron  3.  Hyperthyroidism, has post partum visit in 2022 with endo, plan for same dose of methimazole until that visit   4.  GDMA2- fasting this am 75. Okay to stop blood sugars. Plan 2 hour GTT at PP visit.  5 .  Anticipate d/c home on POD#2     Nickie Jarquin MD  2021  8:56 AM

## 2021-12-31 NOTE — PLAN OF CARE
VSS, Postpartum checks WDL. Incision intact covered with steri strips. Independent with self and infant cares. FOB at bedside and is supportive.

## 2021-12-31 NOTE — PLAN OF CARE
Independent with self and baby cares. Voiding on own. Reports she has had several BM's today and did not want to take stool softener. On scheduled Tylenol and Ibuprofen that she reports is controlling her pain/discomfort. Incision is open to air with steri-strips in place.  at bedside and supportive.

## 2022-01-16 NOTE — DISCHARGE SUMMARY
[facility] Discharge Summary    Bettina Oneil MRN# [id number]   Age: 36 year old YOB: 1985     Date of Admission:  2021  Date of Discharge:  2021  Admitting Physician:  Celeste Moser MD  Discharge Physician:  Nickie Jarquin MD    Admit Dx:   Intrauterine pregnancy at 39w1d  Breech presentation  History of  section  Hyperthyroidism  GDMA2    Discharge Dx:  Intrauterine pregnancy at 39w1d, delivered  Breech presentation  History of  section  Hyperthyroidism  GDMA2    Procedures:  Repeat low segment transverse  section     Admit HPI:  Bettina Oneil is a 36 year old  at 39w1d who was admitted for scheduled repeat  section with fetus in breech presentation. Pregnancy otherwise complicated by GDMA2 on insulin, hyperthyroidism on methimazole. The risks, benefits, and alternatives of  delivery were explained and the patient agreed to proceed.      Please see her admit H&P for full details of her PMH, PSH, Meds, Allergies and exam on admit.    Hospital course:  After discussion of risk and and benefits and signing informed consent the patient was taken to the operating room for repeat  section.    Operative findings:   1. Single viable male infant at 1119 hours on 21. Apgars of 8 and 9 at one and five minutes.  Birth weight: 7lb 10oz.  Fetal presentation: breech. Amniotic fluid: clear. Double nuchal cord. Placenta intact with 3 vessel cord.    2.Normal appearing uterus, fallopian tubes, ovaries.    3. Mild rectofascial adhesions. No intraabdominal adhesions.     EBL from the delivery was 566 ml. Please see her  Section Operative Note for full details regarding her delivery.    Her postoperative course was uncomplicated. On POD#2, she was meeting all of her postpartum goals and deemed stable for discharge. She was voiding without difficulty, tolerating a regular diet without nausea and vomiting, her pain was well  controlled on oral pain medicines and her lochia was appropriate. For her GDMA2, BG levels were checked and satisfactory. Will plan for 2hr GTT at postpartum visit. Her hemoglobin prior to delivery was 13.2 and after delivery was 10.9. Her Rh status was positive and Rhogam was not indicated.       Discharge Medications:  Discharge Medication List as of 12/31/2021 12:42 PM      START taking these medications    Details   acetaminophen (TYLENOL) 500 MG tablet Take 2 tablets (1,000 mg) by mouth every 6 hours as needed for mild pain, OTC      ferrous sulfate (FEROSUL) 325 (65 Fe) MG tablet Take 1 tablet (325 mg) by mouth daily (with breakfast), Disp-30 tablet, OTC      ibuprofen (ADVIL/MOTRIN) 200 MG tablet Take 3 tablets (600 mg) by mouth every 6 hours as needed for mild pain or moderate pain, OTC      oxyCODONE (ROXICODONE) 5 MG tablet Take 1 tablet (5 mg) by mouth every 4 hours as needed for moderate to severe pain, Disp-16 tablet, R-0, Local Print         CONTINUE these medications which have NOT CHANGED    Details   acetone urine (KETOSTIX) test strip Check ketones daily for 1 week or until negative for 1 week, then once a week thereafter.Disp-50 strip, [id number]      !! blood glucose (ACCU-CHEK GUIDE) test strip Use to test blood sugar 4 times daily or as directed., Disp-50 strip, R-4, E-Prescribe      methimazole (TAPAZOLE) 5 MG tablet Take 7.5 mg by mouth daily, Historical      OneTouch Delica Lancets 33G MISC 1 each 4 times daily, Disp-100 each, R-4, E-Prescribe      !! ONETOUCH VERIO IQ test strip Use to test blood sugar 4 times daily., Disp-150 strip, R-3, MAL, E-Prescribe      Prenatal Vit-Fe Fumarate-FA (PRENATAL VITAMIN PO) Take 1 tablet by mouth daily, Historical       !! - Potential duplicate medications found. Please discuss with provider.            Discharge/Disposition:  Bettina Oneil was discharged to home in stable condition with the following instructions/medications:  1) Call for  temperature > 100.4, bright red vaginal bleeding >1 pad an hour x 2 hours, foul smelling vaginal discharge, pain not controlled by usual oral pain meds, persistent nausea and vomiting not controlled on medications, drainage or redness from incision site  2) For feeding she decided to breastfeed.  3) She was instructed to follow-up with her primary OB in 6 weeks for a routine postpartum visit and 2hr GTT. She has a postpartum endocrine visit in 2/2022 and will plan for same dose of methimazole until that visit.   4) Discharge activity:  No heavy lifting >15 lbs or strenuous activity for 6 weeks, pelvic rest for 6 weeks, no driving or operating machinery while on narcotics.      Celeste Moser MD   [facility]

## 2022-06-24 ENCOUNTER — APPOINTMENT (OUTPATIENT)
Dept: URBAN - METROPOLITAN AREA CLINIC 253 | Age: 37
Setting detail: DERMATOLOGY
End: 2022-06-24

## 2022-06-24 VITALS — RESPIRATION RATE: 14 BRPM | HEIGHT: 62 IN | WEIGHT: 120 LBS

## 2022-06-24 DIAGNOSIS — Z71.89 OTHER SPECIFIED COUNSELING: ICD-10-CM

## 2022-06-24 DIAGNOSIS — D22 MELANOCYTIC NEVI: ICD-10-CM

## 2022-06-24 DIAGNOSIS — L81.4 OTHER MELANIN HYPERPIGMENTATION: ICD-10-CM

## 2022-06-24 PROBLEM — D22.5 MELANOCYTIC NEVI OF TRUNK: Status: ACTIVE | Noted: 2022-06-24

## 2022-06-24 PROBLEM — D22.61 MELANOCYTIC NEVI OF RIGHT UPPER LIMB, INCLUDING SHOULDER: Status: ACTIVE | Noted: 2022-06-24

## 2022-06-24 PROBLEM — D22.71 MELANOCYTIC NEVI OF RIGHT LOWER LIMB, INCLUDING HIP: Status: ACTIVE | Noted: 2022-06-24

## 2022-06-24 PROBLEM — D48.5 NEOPLASM OF UNCERTAIN BEHAVIOR OF SKIN: Status: ACTIVE | Noted: 2022-06-24

## 2022-06-24 PROCEDURE — 11102 TANGNTL BX SKIN SINGLE LES: CPT

## 2022-06-24 PROCEDURE — OTHER COUNSELING: OTHER

## 2022-06-24 PROCEDURE — 99203 OFFICE O/P NEW LOW 30 MIN: CPT | Mod: 25

## 2022-06-24 PROCEDURE — OTHER BIOPSY BY SHAVE METHOD: OTHER

## 2022-06-24 PROCEDURE — OTHER MIPS QUALITY: OTHER

## 2022-06-24 ASSESSMENT — LOCATION DETAILED DESCRIPTION DERM
LOCATION DETAILED: SUPERIOR THORACIC SPINE
LOCATION DETAILED: RIGHT RADIAL PALM
LOCATION DETAILED: INFERIOR THORACIC SPINE
LOCATION DETAILED: RIGHT SUPERIOR MEDIAL LOWER BACK
LOCATION DETAILED: RIGHT DORSAL FOOT

## 2022-06-24 ASSESSMENT — LOCATION ZONE DERM
LOCATION ZONE: HAND
LOCATION ZONE: TRUNK
LOCATION ZONE: FEET

## 2022-06-24 ASSESSMENT — LOCATION SIMPLE DESCRIPTION DERM
LOCATION SIMPLE: RIGHT LOWER BACK
LOCATION SIMPLE: RIGHT FOOT
LOCATION SIMPLE: RIGHT HAND
LOCATION SIMPLE: UPPER BACK

## 2022-06-24 NOTE — PROCEDURE: BIOPSY BY SHAVE METHOD
Biopsy Method: Dermablade
Validate Note Data (See Information Below): No
Cryotherapy Text: The wound bed was treated with cryotherapy after the biopsy was performed.
Hemostasis: Drysol
Depth Of Biopsy: dermis
Electrodesiccation And Curettage Text: The wound bed was treated with electrodesiccation and curettage after the biopsy was performed.
Information: Selecting Yes will display possible errors in your note based on the variables you have selected. This validation is only offered as a suggestion for you. PLEASE NOTE THAT THE VALIDATION TEXT WILL BE REMOVED WHEN YOU FINALIZE YOUR NOTE. IF YOU WANT TO FAX A PRELIMINARY NOTE YOU WILL NEED TO TOGGLE THIS TO 'NO' IF YOU DO NOT WANT IT IN YOUR FAXED NOTE.
Billing Type: Third-Party Bill
Post-Care Instructions: I reviewed with the patient in detail post-care instructions. Patient is to keep the biopsy site dry overnight, and then apply bacitracin twice daily until healed. Patient may apply hydrogen peroxide soaks to remove any crusting.
Silver Nitrate Text: The wound bed was treated with silver nitrate after the biopsy was performed.
Type Of Destruction Used: Curettage
Anesthesia Type: 1% lidocaine with epinephrine
X Size Of Lesion In Cm: 0
Consent: Written consent was obtained and risks were reviewed including but not limited to scarring, infection, bleeding, scabbing, incomplete removal, nerve damage and allergy to anesthesia.
Wound Care: Petrolatum
Was A Bandage Applied: Yes
Electrodesiccation Text: The wound bed was treated with electrodesiccation after the biopsy was performed.
Detail Level: Detailed
Curettage Text: The wound bed was treated with curettage after the biopsy was performed.
Anesthesia Volume In Cc (Will Not Render If 0): 0.5
Dressing: bandage
Biopsy Type: H and E
Notification Instructions: Patient will be notified of biopsy results. However, patient instructed to call the office if not contacted within 2 weeks.

## 2022-09-01 ENCOUNTER — HOSPITAL ENCOUNTER (OUTPATIENT)
Dept: ULTRASOUND IMAGING | Facility: CLINIC | Age: 37
Discharge: HOME | End: 2022-09-01
Attending: INTERNAL MEDICINE | Admitting: INTERNAL MEDICINE
Payer: COMMERCIAL

## 2022-09-01 DIAGNOSIS — E04.1 THYROID NODULE: ICD-10-CM

## 2022-09-01 PROCEDURE — 76536 US EXAM OF HEAD AND NECK: CPT

## 2022-10-15 ENCOUNTER — HEALTH MAINTENANCE LETTER (OUTPATIENT)
Age: 37
End: 2022-10-15

## 2023-03-26 ENCOUNTER — HEALTH MAINTENANCE LETTER (OUTPATIENT)
Age: 38
End: 2023-03-26

## 2023-06-21 ENCOUNTER — HOSPITAL ENCOUNTER (OUTPATIENT)
Dept: ULTRASOUND IMAGING | Facility: CLINIC | Age: 38
Discharge: HOME | End: 2023-06-21
Attending: INTERNAL MEDICINE | Admitting: INTERNAL MEDICINE
Payer: COMMERCIAL

## 2023-06-21 DIAGNOSIS — E04.1 THYROID NODULE: ICD-10-CM

## 2023-06-21 PROCEDURE — 88112 CYTOPATH CELL ENHANCE TECH: CPT | Mod: TC | Performed by: INTERNAL MEDICINE

## 2023-06-21 PROCEDURE — 88173 CYTOPATH EVAL FNA REPORT: CPT | Mod: 26 | Performed by: PATHOLOGY

## 2023-06-21 PROCEDURE — 88305 TISSUE EXAM BY PATHOLOGIST: CPT | Mod: TC | Performed by: INTERNAL MEDICINE

## 2023-06-21 PROCEDURE — 88112 CYTOPATH CELL ENHANCE TECH: CPT | Mod: 26 | Performed by: PATHOLOGY

## 2023-06-21 PROCEDURE — 272N000431 US BIOPSY THYROID FINE NEEDLE ASPIRATION

## 2023-06-21 PROCEDURE — 88305 TISSUE EXAM BY PATHOLOGIST: CPT | Mod: 26 | Performed by: PATHOLOGY

## 2023-06-21 PROCEDURE — 250N000009 HC RX 250: Performed by: RADIOLOGY

## 2023-06-21 RX ADMIN — LIDOCAINE HYDROCHLORIDE 5 ML: 10 INJECTION, SOLUTION EPIDURAL; INFILTRATION; INTRACAUDAL; PERINEURAL at 13:12

## 2023-06-21 NOTE — PROGRESS NOTES
Assisted Dr. Acosta in right thyroid nodule biopsy without conscious Sedation. Pt tolerated procedure well with monitoring per unit standard. Medications given per MAR. No immediate adverse effect noted. Specimens placed in sterile container and brought to lab for analysis. Pt monitored until discharge. Pt given all instructions prior to procedure and all questions answered.  Pt left in stable condition.

## 2023-06-23 LAB
PATH REPORT.COMMENTS IMP SPEC: NORMAL
PATH REPORT.COMMENTS IMP SPEC: NORMAL
PATH REPORT.FINAL DX SPEC: NORMAL
PATH REPORT.GROSS SPEC: NORMAL
PATH REPORT.MICROSCOPIC SPEC OTHER STN: NORMAL
PATH REPORT.RELEVANT HX SPEC: NORMAL

## 2024-05-26 ENCOUNTER — HEALTH MAINTENANCE LETTER (OUTPATIENT)
Age: 39
End: 2024-05-26

## 2025-06-14 ENCOUNTER — HEALTH MAINTENANCE LETTER (OUTPATIENT)
Age: 40
End: 2025-06-14

## (undated) DEVICE — LINEN FULL SHEET 5511

## (undated) DEVICE — SOL NACL 0.9% IRRIG 1000ML BOTTLE 2F7124

## (undated) DEVICE — GLOVE PROTEXIS POWDER FREE SMT 6.5  2D72PT65X

## (undated) DEVICE — CATH TRAY FOLEY SURESTEP 16FR DRAIN BAG STATOCK A899916

## (undated) DEVICE — PREP CHLORAPREP 26ML TINTED HI-LITE ORANGE 930815

## (undated) DEVICE — BLADE CLIPPER SGL USE 9680

## (undated) DEVICE — STOCKING SLEEVE VASOPRESS COMPRESSION CALF MED 18" VP501M

## (undated) DEVICE — Device

## (undated) DEVICE — SOL WATER IRRIG 1000ML BOTTLE 2F7114

## (undated) DEVICE — SU VICRYL 0 CT-1 36" J346H

## (undated) DEVICE — PACK C-SECTION LF PL15OTA83B

## (undated) DEVICE — LINEN HALF SHEET 5512

## (undated) DEVICE — LINEN TOWEL PACK X10 5473

## (undated) DEVICE — TRANSFER DEVICE BLOOD NDL HOLDER 364880

## (undated) DEVICE — SU VICRYL 2-0 CT-1 27" UND J259H

## (undated) DEVICE — LINEN DRAPE 54X72" 5467

## (undated) DEVICE — LINEN BABY BLANKET 5434

## (undated) DEVICE — GLOVE PROTEXIS BLUE W/NEU-THERA 6.5  2D73EB65

## (undated) DEVICE — BAG CLEAR TRASH 1.3M 39X33" P4040C

## (undated) DEVICE — SU VICRYL 4-0 PS-2 18" UND J496H

## (undated) DEVICE — PAD CHUX UNDERPAD 30X36" P3036C

## (undated) DEVICE — ESU GROUND PAD ADULT W/CORD E7507

## (undated) DEVICE — CAP BABY PINK/BLUE IC-2

## (undated) RX ORDER — ONDANSETRON 2 MG/ML
INJECTION INTRAMUSCULAR; INTRAVENOUS
Status: DISPENSED
Start: 2021-12-29

## (undated) RX ORDER — DEXAMETHASONE SODIUM PHOSPHATE 4 MG/ML
INJECTION, SOLUTION INTRA-ARTICULAR; INTRALESIONAL; INTRAMUSCULAR; INTRAVENOUS; SOFT TISSUE
Status: DISPENSED
Start: 2021-12-29

## (undated) RX ORDER — FENTANYL CITRATE 50 UG/ML
INJECTION, SOLUTION INTRAMUSCULAR; INTRAVENOUS
Status: DISPENSED
Start: 2021-12-29

## (undated) RX ORDER — KETOROLAC TROMETHAMINE 30 MG/ML
INJECTION, SOLUTION INTRAMUSCULAR; INTRAVENOUS
Status: DISPENSED
Start: 2021-12-29

## (undated) RX ORDER — FENTANYL CITRATE-0.9 % NACL/PF 10 MCG/ML
PLASTIC BAG, INJECTION (ML) INTRAVENOUS
Status: DISPENSED
Start: 2021-12-29

## (undated) RX ORDER — MORPHINE SULFATE 1 MG/ML
INJECTION, SOLUTION EPIDURAL; INTRATHECAL; INTRAVENOUS
Status: DISPENSED
Start: 2021-12-29

## (undated) RX ORDER — OXYTOCIN/0.9 % SODIUM CHLORIDE 30/500 ML
PLASTIC BAG, INJECTION (ML) INTRAVENOUS
Status: DISPENSED
Start: 2021-12-29